# Patient Record
Sex: MALE | Race: WHITE | NOT HISPANIC OR LATINO | Employment: FULL TIME
[De-identification: names, ages, dates, MRNs, and addresses within clinical notes are randomized per-mention and may not be internally consistent; named-entity substitution may affect disease eponyms.]

---
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
## 2021-02-05 ENCOUNTER — OFFICE VISIT (OUTPATIENT)
Dept: FAMILY MEDICINE | Facility: CLINIC | Age: 21
End: 2021-02-05
Payer: COMMERCIAL

## 2021-02-05 VITALS
WEIGHT: 152 LBS | SYSTOLIC BLOOD PRESSURE: 136 MMHG | BODY MASS INDEX: 23.04 KG/M2 | RESPIRATION RATE: 18 BRPM | HEIGHT: 68 IN | OXYGEN SATURATION: 98 % | DIASTOLIC BLOOD PRESSURE: 83 MMHG | HEART RATE: 83 BPM | TEMPERATURE: 98 F

## 2021-02-05 DIAGNOSIS — K21.9 GASTROESOPHAGEAL REFLUX DISEASE, UNSPECIFIED WHETHER ESOPHAGITIS PRESENT: Primary | ICD-10-CM

## 2021-02-05 PROBLEM — Z80.0 FH: COLON CANCER IN RELATIVE <50 YEARS OLD: Status: ACTIVE | Noted: 2021-02-05

## 2021-02-05 PROCEDURE — 99203 OFFICE O/P NEW LOW 30 MIN: CPT | Performed by: PHYSICIAN ASSISTANT

## 2021-02-05 ASSESSMENT — MIFFLIN-ST. JEOR: SCORE: 1668.97

## 2021-02-05 NOTE — PATIENT INSTRUCTIONS
Patient Education     GERD (Adult)    The esophagus is a tube that carries food from the mouth to the stomach. A valve (the LES, lower esophageal sphincter) at the lower end of the esophagus prevents stomach acid from flowing upward. When this valve doesn't work properly, stomach contents may repeatedly flow back up (reflux) into the esophagus. This is called gastroesophageal reflux disease (GERD). GERD can irritate the esophagus. It can cause problems with pain, swallowing or breathing. In severe cases, GERD can cause recurrent pneumonia (from aspiration or breathing in particles) or other serious problems.  Symptoms of reflux include burning, pressure or sharp pain in the upper abdomen or mid to lower chest. The pain can spread to the neck, back, or shoulder. There may be belching, an acid taste in the back of the throat, chronic cough, or sore throat, or hoarseness. GERD symptoms often occur during the day after a big meal. They can also occur at night when lying down.   Home care  Lifestyle changes can help reduce symptoms. If needed, your healthcare provider may prescribe medicines. Symptoms often improve with treatment, but if treatment is stopped, the symptoms often return after a few months. So most persons with GERD will need to continue treatment or get treatment on and off.  Lifestyle changes    Limit or avoid fatty, fried, and spicy foods, as well as coffee, chocolate, mint, and foods with high acid content such as tomatoes and citrus fruit and juices (orange, grapefruit, lemon).    Don t eat large meals, especially at night. Frequent, smaller meals are best. Don't lie down right after eating. And don t eat anything 3 hours before going to bed.    Don't drink alcohol or smoke. As much as possible, stay away from second hand smoke.    If you are overweight, losing weight will reduce symptoms.     Don't wear tight clothing around your stomach area.    If your symptoms occur during sleep, use a foam wedge  "to elevate your upper body (not just your head.) Or, place 4\" blocks under the head of your bed. Or use 2 bed risers under your bedframe.  Medicines  If needed, medicines can help relieve the symptoms of GERD and prevent damage to the esophagus. Discuss a medicine plan with your healthcare provider. This may include one or more of the following medicines:    Antacids to help neutralize the normal acids in your stomach.    Acid blockers (Histamine or H2 blockers) to decrease acid production.    Acid inhibitors (proton pump inhibitors PPIs) to decrease acid production in a different way than the blockers. They may work better, but can take a little longer to take effect.  Take an antacid 30 to 60 minutes after eating and at bedtime, but not at the same time as an acid blocker.  Try not to take medicines such as ibuprofen and aspirin. If you are taking aspirin for your heart or other medical reasons, talk to your healthcare provider about stopping it.  Follow-up care  Follow up with your healthcare provider or as advised by our staff.  When to seek medical advice  Call your healthcare provider if any of the following occur:    Stomach pain gets worse or moves to the lower right abdomen (appendix area)    Chest pain appears or gets worse, or spreads to the back, neck, shoulder, or arm    An over-the-counter trial of medicine doesn't relieve your symptoms    Weight loss that can't be explained    Trouble or pain swallowing    Frequent vomiting (can t keep down liquids)    Blood in the stool or vomit (red or black in color)    Feeling weak or dizzy    Fever of 100.4 F (38 C) or higher, or as directed by your healthcare provider  Jane last reviewed this educational content on 3/1/2018    5010-7495 The "[employer]. [address] [place] [address], [place] [address]. All rights reserved. This information is not intended as a substitute for professional medical care. Always follow your healthcare professional's " instructions.           Patient Education     Medicines for GERD  Gastroesophageal reflux disease (GERD) can be treated with medicine. This may be done with a medicine you can buy over the counter. Or with a medicine that your healthcare provider has to prescribe. In some cases, both types may be used. Your provider will tell you what is best for your symptoms.   Antacids  Antacids work to weaken the acid in your stomach. They can give you quick relief. You can buy many of them with no prescription. Antacids can be high in sodium. This may be a problem if you have high blood pressure. Some antacids also have aluminum. This should be avoided if you have long-term (chronic) kidney disease. So check with your provider first. Take antacids only when you need to, as advised by your provider.   Side effects: Constipation, diarrhea. If you take too much medicine, it can cause calcium to build up.    H-2 blockers  These cause the stomach to make less acid. They are often used on demand as symptoms occur. And they are used daily to keep symptoms away. Your provider may prescribe them if antacids don t work for you. You can buy some of them over the counter. These come in a lower dosage.   Side effects: Confusion in older adults.   Proton-pump inhibitors  These also cause the stomach to make less acid. They reduce stomach acid more than H-2 blockers. They may be used for a short time, or longer to treat certain conditions. You can buy some of them over the counter. Or your provider may prescribe them. They help control GERD symptoms.   Side effects: Belly pain, diarrhea, upset stomach. Possible other side effects linked to long-term use and high doses.   Prokinetics  These medicines affect the movement of the digestive tract. They may be advised if your stomach is emptying too slowly. But in most cases they are not advised for treating GERD.   Side effects:Tiredness, depression, anxiety, problems with physical movement, belly  cramps, constipation, diarrhea, a jittery feeling.   Medicines to stay away from  Don t take aspirin without your healthcare provider s approval. And don t take a nonsteroidal anti-inflammatory drug (NSAID), such as ibuprofen. These reduce the protective lining of your stomach. This can lead to more GERD symptoms. Check with your provider or pharmacist before taking a new medicine.   [employer] last reviewed this educational content on 6/1/2019 2000-2020 [employer]. [address] [place] [address], [place] [address]. All rights reserved. This information is not intended as a substitute for professional medical care. Always follow your healthcare professional's instructions.

## 2021-02-05 NOTE — PROGRESS NOTES
"    Hunter Matamoros is a 20 year old who presents to clinic today for the following health issues     HPI       GERD/Heartburn  Onset/Duration: 3ish years   Description: worse over the last year   Intensity: moderate  Progression of Symptoms: worsening  Accompanying Signs & Symptoms:  Does it feel like food gets stuck or trouble swallowing: no  Nausea: no  Vomiting (bloody?): no  Abdominal Pain: no  Black-Tarry stools: no  Bloody stools: no  History:  Previous similar episodes: no  Previous ulcers: no  Precipitating factors:   Caffeine use: YES-   Alcohol use: YES- occasionally   NSAID/Aspirin use: no  Tobacco use: no  Worse with after eating, coffee, spicy food, fatty foods, alcohol   Alleviating factors: None  Therapies tried and outcome:             Lifestyle changes: switched from coffee to tea recently             Medications: Tums helps     New Patient/Transfer of Care    Review of Systems   Constitutional, HEENT, cardiovascular, pulmonary, gi and gu systems are negative, except as otherwise noted.      Objective    /83   Pulse 83   Temp 98  F (36.7  C) (Tympanic)   Resp 18   Ht 1.727 m (5' 8\")   Wt 68.9 kg (152 lb)   SpO2 98%   BMI 23.11 kg/m    Body mass index is 23.11 kg/m .  Physical Exam   GENERAL: healthy, alert and no distress  EYES: Eyes grossly normal to inspection, PERRL and conjunctivae and sclerae normal  HENT: ear canals and TM's normal, nose and mouth without ulcers or lesions  NECK: no adenopathy, no asymmetry, masses, or scars and thyroid normal to palpation  RESP: lungs clear to auscultation - no rales, rhonchi or wheezes  CV: regular rate and rhythm, normal S1 S2, no S3 or S4, no murmur, click or rub, no peripheral edema and peripheral pulses strong  ABDOMEN: soft, nontender, no hepatosplenomegaly, no masses and bowel sounds normal  MS: no gross musculoskeletal defects noted, no edema  NEURO: Normal strength and tone, mentation intact and speech normal  PSYCH: mentation " appears normal, affect normal/bright      ICD-10-CM    1. Gastroesophageal reflux disease, unspecified whether esophagitis present  K21.9 omeprazole (PRILOSEC) 20 MG DR capsule     Talk to patient about his concerns.  We talked about dietary changes.  We talked about starting omeprazole daily for 4 weeks and follow-up at that time if he is not improving otherwise on as-needed basis.  Warning signs side effects were discussed.   see patient instructions for details.        Oneal Reyes PA-C

## 2021-04-11 ENCOUNTER — HEALTH MAINTENANCE LETTER (OUTPATIENT)
Age: 21
End: 2021-04-11

## 2021-04-27 ENCOUNTER — NURSE TRIAGE (OUTPATIENT)
Dept: NURSING | Facility: CLINIC | Age: 21
End: 2021-04-27

## 2021-04-27 NOTE — TELEPHONE ENCOUNTER
"Patient having.cold symptoms    Runny nose,   Mild sore throat ,phlegm is yellowish.  No fever , had chills yesterday, and had body aches     Has covid questions.  Answered testing questions.per protocol.    Val Tineo RN on 4/27/2021 at 11:17 AM  COVID 19 Nurse Triage Plan/Patient Instructions    Please be aware that novel coronavirus (COVID-19) may be circulating in the community. If you develop symptoms such as fever, cough, or SOB or if you have concerns about the presence of another infection including coronavirus (COVID-19), please contact your health care provider or visit [email or web address].     Disposition/Instructions    Home care recommended. Follow home care protocol based instructions.  Virtual Visit with provider recommended. Reference Visit Selection Guide.  Additional COVID19 information to add for patients.   How can I protect others?  If you have symptoms (fever, cough, body aches or trouble breathing): Stay home and away from others (self-isolate) until:    At least 10 days have passed since your symptoms started, And     You ve had no fever--and no medicine that reduces fever--for 1 full day (24 hours), And      Your other symptoms have resolved (gotten better).     If you don t have symptoms, but a test showed that you have COVID-19 (you tested positive):    Stay home and away from others (self-isolate). Follow the tips under \"How do I self-isolate?\" below for 10 days (20 days if you have a weak immune system).    You don't need to be retested for COVID-19 before going back to school or work. As long as you're fever-free and feeling better, you can go back to school, work and other activities after waiting the 10 or 20 days.     How do I self-isolate?    Stay in your own room, even for meals. Use your own bathroom if you can.     Stay away from others in your home. No hugging, kissing or shaking hands. No visitors.    Don t go to work, school or anywhere else.     Clean  high " touch  surfaces often (doorknobs, counters, handles, etc.). Use a household cleaning spray or wipes. You ll find a full list on the EPA website:  www.epa.gov/pesticide-registration/list-n-disinfectants-use-against-sars-cov-2.    Cover your mouth and nose with a mask, tissue or washcloth to avoid spreading germs.    Wash your hands and face often. Use soap and water.    Caregivers in these groups are at risk for severe illness due to COVID-19:  o People 65 years and older  o People who live in a nursing home or long-term care facility  o People with chronic disease (lung, heart, cancer, diabetes, kidney, liver, immunologic)  o People who have a weakened immune system, including those who:  - Are in cancer treatment  - Take medicine that weakens the immune system, such as corticosteroids  - Had a bone marrow or organ transplant  - Have an immune deficiency  - Have poorly controlled HIV or AIDS  - Are obese (body mass index of 40 or higher)  - Smoke regularly    Caregivers should wear gloves while washing dishes, handling laundry and cleaning bedrooms and bathrooms.    Use caution when washing and drying laundry: Don t shake dirty laundry, and use the warmest water setting that you can.    For more tips, go to www.cdc.gov/coronavirus/2019-ncov/downloads/10Things.pdf.    How can I take care of myself?  1. Get lots of rest. Drink extra fluids (unless a doctor has told you not to).     2. Take Tylenol (acetaminophen) for fever or pain. If you have liver or kidney problems, ask your family doctor if it s okay to take Tylenol.     Adults can take either:     650 mg (two 325 mg pills) every 4 to 6 hours, or     1,000 mg (two 500 mg pills) every 8 hours as needed.     Note: Don t take more than 3,000 mg in one day.   Acetaminophen is found in many medicines (both prescribed and over-the-counter medicines). Read all labels to be sure you don t take too much.     For children, check the Tylenol bottle for the right dose. The  dose is based on the child s age or weight.    3. If you have other health problems (like cancer, heart failure, an organ transplant or severe kidney disease): Call your specialty clinic if you don t feel better in the next 2 days.    4. Know when to call 911: Emergency warning signs include:    Trouble breathing or shortness of breath    Pain or pressure in the chest that doesn t go away    Feeling confused like you haven t felt before, or not being able to wake up    Bluish-colored lips or face    What are the symptoms of COVID-19?     The most common symptoms are cough, fever and trouble breathing.     Less common symptoms include body aches, chills, diarrhea (loose, watery poops), fatigue (feeling very tired), headache, runny nose, sore throat and loss of smell.    COVID-19 can cause severe coughing (bronchitis) and lung infection (pneumonia).    How does it spread?     The virus may spread when a person coughs or sneezes into the air. The virus can travel about 6 feet this way, and it can live on surfaces.      Common  (household disinfectants) will kill the virus.    Who is at risk?  Anyone can catch COVID-19 if they re around someone who has the virus.    How can others protect themselves?     Stay away from people who have COVID-19 (or symptoms of COVID-19).    Wash hands often with soap and water. Or, use hand  with at least 60% alcohol.    Avoid touching the eyes, nose or mouth.     Wear a face mask when you go out in public, when sick or when caring for a sick person.    Where can I get more information?     [employer] [place]: About COVID-19: [email or web address]    CDC: What to Do If You re Sick: www.cdc.gov/coronavirus/2019-ncov/about/steps-when-sick.html    CDC: Ending Home Isolation: www.cdc.gov/coronavirus/2019-ncov/hcp/disposition-in-home-patients.html     CDC: Caring for Someone: www.cdc.gov/coronavirus/2019-ncov/if-you-are-sick/care-for-someone.html     [facility]: Interim  Guidance for Hospital Discharge to Home: [email or web address]/[email or web address]/hcp/hospdischarge.pdf    [facility] clinical trials (COVID-19 research studies): [email or web address]-clinical-trials     Below are the COVID-19 hotlines at the [place] [facility] ([facility]). Interpreters are available.   o For health questions: Call 894-435-3507 or 1-847.283.8480 (7 a.m. to 7 p.m.)  o For questions about schools and childcare: Call 869-579-3676 or 1-562.750.3217 (7 a.m. to 7 p.m.)          Thank you for taking steps to prevent the spread of this virus.  o Limit your contact with others.  o Wear a simple mask to cover your cough.  o Wash your hands well and often.    Resources     [employer]: About COVID-19: [email or web address]    CDC: What to Do If You're Sick: www.cdc.gov/coronavirus/2019-ncov/about/steps-when-sick.html    CDC: Ending Home Isolation: www.cdc.gov/coronavirus/2019-ncov/hcp/disposition-in-home-patients.html     CDC: Caring for Someone: www.cdc.gov/coronavirus/2019-ncov/if-you-are-sick/care-for-someone.html     [facility]: Interim Guidance for Hospital Discharge to Home: [email or web address]/[email or web address]/hcp/hospdischarge.pdf    [facility] clinical trials (COVID-19 research studies): [email or web address]-clinical-trials     Below are the COVID-19 hotlines at the [place] [facility] ([facility]). Interpreters are available.   o For health questions: Call 374-390-3557 or 1-952.947.9801 (7 a.m. to 7 p.m.)  o For questions about schools and childcare: Call 969-268-6127 or 1-551.652.6870 (7 a.m. to 7 p.m.)                   Reason for Disposition    COVID-19 Testing, questions about    Protocols used: CORONAVIRUS (COVID-19) DIAGNOSED OR [id number] 1.3

## 2021-05-04 ENCOUNTER — IMMUNIZATION (OUTPATIENT)
Dept: PEDIATRICS | Facility: CLINIC | Age: 21
End: 2021-05-04
Payer: COMMERCIAL

## 2021-05-04 PROCEDURE — 91300 PR COVID VAC PFIZER DIL RECON 30 MCG/0.3 ML IM: CPT

## 2021-05-04 PROCEDURE — 0001A PR COVID VAC PFIZER DIL RECON 30 MCG/0.3 ML IM: CPT

## 2021-05-25 ENCOUNTER — IMMUNIZATION (OUTPATIENT)
Dept: NURSING | Facility: CLINIC | Age: 21
End: 2021-05-25
Attending: INTERNAL MEDICINE
Payer: COMMERCIAL

## 2021-05-25 PROCEDURE — 0002A PR COVID VAC PFIZER DIL RECON 30 MCG/0.3 ML IM: CPT

## 2021-05-25 PROCEDURE — 91300 PR COVID VAC PFIZER DIL RECON 30 MCG/0.3 ML IM: CPT

## 2021-09-26 ENCOUNTER — HEALTH MAINTENANCE LETTER (OUTPATIENT)
Age: 21
End: 2021-09-26

## 2021-11-04 ENCOUNTER — MYC MEDICAL ADVICE (OUTPATIENT)
Dept: FAMILY MEDICINE | Facility: CLINIC | Age: 21
End: 2021-11-04

## 2021-11-04 DIAGNOSIS — K21.9 GASTROESOPHAGEAL REFLUX DISEASE, UNSPECIFIED WHETHER ESOPHAGITIS PRESENT: Primary | ICD-10-CM

## 2021-11-29 ASSESSMENT — ENCOUNTER SYMPTOMS
NAUSEA: 0
HEARTBURN: 0
EYE PAIN: 0
ABDOMINAL PAIN: 0
DIARRHEA: 0
NERVOUS/ANXIOUS: 0
COUGH: 0
HEMATURIA: 0
FEVER: 0
CHILLS: 0
JOINT SWELLING: 0
WEAKNESS: 0
SHORTNESS OF BREATH: 0
ARTHRALGIAS: 0
MYALGIAS: 0
DYSURIA: 0
DIZZINESS: 0
HEMATOCHEZIA: 0
FREQUENCY: 0
PARESTHESIAS: 0
HEADACHES: 0
SORE THROAT: 0
PALPITATIONS: 0
CONSTIPATION: 0

## 2021-12-03 ASSESSMENT — ENCOUNTER SYMPTOMS
SORE THROAT: 0
DIARRHEA: 0
COUGH: 0
DIZZINESS: 0
HEMATOCHEZIA: 0
EYE PAIN: 0
CONSTIPATION: 0
HEARTBURN: 0
ARTHRALGIAS: 0
ABDOMINAL PAIN: 0
WEAKNESS: 0
DYSURIA: 0
FREQUENCY: 0
MYALGIAS: 0
PALPITATIONS: 0
HEADACHES: 0
HEMATURIA: 0
NERVOUS/ANXIOUS: 0
NAUSEA: 0
SHORTNESS OF BREATH: 0
CHILLS: 0
PARESTHESIAS: 0
JOINT SWELLING: 0
FEVER: 0

## 2021-12-03 NOTE — PROGRESS NOTES
SUBJECTIVE:   CC: Saturnino Matthews is an 21 year old male who presents for preventative health visit.     Patient has been advised of split billing requirements and indicates understanding: Yes         SH-interested in medication school.       Pt is not fasting and did not have labs completed.        - Per pt discuss looking into meds for acid reflux sx.   Omeprazole worked for 1.5 months. But gave him diarrhea. Even when he re-tried it gave him diarrhea.   Lansoprazole or nexium gave him diarrhea and the other didn't work.   Famotidine helped but not as much as omeprazole.   No dark or bloody stools.     Doesn't eat before bed. Doesn't eat large amounts.   bmi is normal.   Not much caffeine or alcohol. 1-2 for both a week.   Nonsmoker.     Has kept a food diary to see if he could figure anything.  Banana bread muffin even triggered it.     No nsaids.     Heartburn for many years, most days. Random.           Healthy Habits:    Getting at least 3 servings of Calcium per day:  Yes    Bi-annual eye exam:  Yes    Dental care twice a year:  Yes    Sleep apnea or symptoms of sleep apnea:  None    Diet:  Regular (no restrictions)    Frequency of exercise:  2-3 days/week    Duration of exercise:  45-60 minutes    Taking medications regularly:  Yes    Medication side effects:  Not applicable    PHQ-2 Total Score:    Additional concerns today:  Yes           Today's PHQ-2 Score:   PHQ-2 ( 1999 Pf[employer]) 11/29/2021   Q1: Little interest or pleasure in doing things 0   Q2: Feeling down, depressed or hopeless 0   PHQ-2 Score 0   PHQ-2 Total Score (12-17 Years)- Positive if 3 or more points; Administer PHQ-A if positive -   Q1: Little interest or pleasure in doing things Not at all   Q2: Feeling down, depressed or hopeless Not at all   PHQ-2 Score 0           Abuse: Current or Past(Physical, Sexual or Emotional)- No  Do you feel safe in your environment? Yes        Social History     Tobacco Use     Smoking status: Never  Smoker     Smokeless tobacco: Never Used     Tobacco comment: No exposure.   Substance Use Topics     Alcohol use: Yes     If you drink alcohol do you typically have >3 drinks per day or >7 drinks per week? No    No flowsheet data found.    Last PSA: No results found for: PSA    Reviewed orders with patient. Reviewed health maintenance and updated orders accordingly - Yes  Lab work is in process  Labs reviewed in EPIC  BP Readings from Last 3 Encounters:   12/06/21 134/81   02/05/21 136/83   09/29/16 133/82 (93 %, Z = 1.48 /  93 %, Z = 1.48)*     *BP percentiles are based on the 2017 AAP Clinical Practice Guideline for boys    Wt Readings from Last 3 Encounters:   12/06/21 67.6 kg (149 lb)   02/05/21 68.9 kg (152 lb)   09/29/16 59.9 kg (132 lb 0.9 oz) (27 %, Z= -0.61)*     * Growth percentiles are based on [facility] (Boys, 2-20 Years) data.                  Patient Active Problem List   Diagnosis     Palpitations     Gastroesophageal reflux disease, unspecified whether esophagitis present     FH: colon cancer in relative <50 years old     Past Surgical History:   Procedure Laterality Date     ENT SURGERY  Adenoids       Social History     Tobacco Use     Smoking status: Never Smoker     Smokeless tobacco: Never Used     Tobacco comment: No exposure.   Substance Use Topics     Alcohol use: Yes     Family History   Problem Relation Age of Onset     Hypertension Mother      Colon Cancer Father 48        Stage 4 colon cancer.     Hypertension Maternal Grandmother      Aneurysm Maternal Grandfather         Brain     Melanoma Paternal Grandfather      Prostate Cancer Paternal Grandfather      Other - See Comments Other         A-fib.         Current Outpatient Medications   Medication Sig Dispense Refill     famotidine (PEPCID) 20 MG tablet Take 1 tablet (20 mg) by mouth 2 times daily 60 tablet 1     Allergies   Allergen Reactions     Penicillins      Zithromax [Azithromycin]        Reviewed and updated as needed this visit by  clinical staff  Tobacco  Allergies  Meds   Med Hx  Surg Hx  Fam Hx  Soc Hx       Reviewed and updated as needed this visit by Provider               History reviewed. No pertinent past medical history.   Past Surgical History:   Procedure Laterality Date     ENT SURGERY  Adenoids       Review of Systems   Constitutional: Negative for chills and fever.   HENT: Negative for congestion, ear pain, hearing loss and sore throat.    Eyes: Negative for pain and visual disturbance.   Respiratory: Negative for cough and shortness of breath.    Cardiovascular: Negative for chest pain, palpitations and peripheral edema.   Gastrointestinal: Negative for abdominal pain, constipation, diarrhea, heartburn, hematochezia and nausea.   Genitourinary: Negative for dysuria, frequency, genital sores, hematuria, impotence, penile discharge and urgency.   Musculoskeletal: Negative for arthralgias, joint swelling and myalgias.   Skin: Negative for rash.   Neurological: Negative for dizziness, weakness, headaches and paresthesias.   Psychiatric/Behavioral: Negative for mood changes. The patient is not nervous/anxious.      OBJECTIVE:   /81   Pulse 96   Temp 97.2  F (36.2  C) (Tympanic)   Resp 14   Wt 67.6 kg (149 lb)   SpO2 99%   BMI 22.66 kg/m      Physical Exam  GENERAL: healthy, alert and no distress  EYES: Eyes grossly normal to inspection, PERRL and conjunctivae and sclerae normal  HENT: ear canals and TM's normal, nose and mouth without ulcers or lesions  NECK: no adenopathy, no asymmetry, masses, or scars and thyroid normal to palpation  RESP: lungs clear to auscultation - no rales, rhonchi or wheezes  CV: regular rate and rhythm, normal S1 S2, no S3 or S4, no murmur, click or rub, no peripheral edema and peripheral pulses strong  ABDOMEN: soft, nontender, no hepatosplenomegaly, no masses and bowel sounds normal  MS: no gross musculoskeletal defects noted, no edema  SKIN: no suspicious lesions or rashes  NEURO:  "Normal strength and tone, mentation intact and speech normal  PSYCH: mentation appears normal, affect normal/bright    Diagnostic Test Results:  Labs reviewed in Epic    ASSESSMENT/PLAN:   (Z00.00) Routine general medical examination at a health care facility  (primary encounter diagnosis)  Comment:   Plan:     (K21.9) Gastroesophageal reflux disease, unspecified whether esophagitis present  Comment: not improving  Plan: Helicobacter pylori Antigen Stool, famotidine         (PEPCID) 20 MG tablet, Adult Gastro Ref -         Consult Only          See below for plan    May need EDG see below    Patient has been advised of split billing requirements and indicates understanding: Yes  COUNSELING:   Reviewed preventive health counseling, as reflected in patient instructions       Regular exercise       Healthy diet/nutrition       Vision screening       Hearing screening    Estimated body mass index is 22.66 kg/m  as calculated from the following:    Height as of 2/5/21: 1.727 m (5' 8\").    Weight as of this encounter: 67.6 kg (149 lb).         He reports that he has never smoked. He has never used smokeless tobacco.    Patient Instructions   Schedule with gastroenterology, you can cancel if symptoms resolve but this way you have an appointment [facility]    Start famotidine twice daily    Take for 6 weeks. If not helpful let me know after 1 week and I will order scope. OR let me know if after stopping medication in 6 weeks symptoms return and I will order scope.           Preventive Health Recommendations  Male Ages 21 - 25     Yearly exam:             See your health care provider every year in order to  o   Review health changes.   o   Discuss preventive care.    o   Review your medicines if your doctor has prescribed any.    You should be tested each year for STDs (sexually transmitted diseases).     Talk to your provider about cholesterol testing.      If you are at risk for diabetes, you should have a diabetes test (fasting " glucose).    Shots: Get a flu shot each year. Get a tetanus shot every 10 years.     Nutrition:    Eat at least 5 servings of fruits and vegetables daily.     Eat whole-grain bread, whole-wheat pasta and brown rice instead of white grains and rice.     Get adequate calcium and Vitamin D.     Lifestyle    Exercise for at least 150 minutes a week (30 minutes a day, 5 days a week). This will help you control your weight and prevent disease.     Limit alcohol to one drink per day.     No smoking.     Wear sunscreen to prevent skin cancer.     See your dentist every six months for an exam and cleaning.     Patient Education     Tips to Control Acid Reflux    To control acid reflux, you ll need to make some basic diet and lifestyle changes. The simple steps outlined below may be all you ll need to ease discomfort.   Watch what you eat    Don't have fatty foods or spicy foods.    Eat fewer acidic foods, such as citrus and tomato-based foods. These can increase symptoms.    Limit drinking alcohol, caffeine, and fizzy beverages. All increase acid reflux.    Try limiting chocolate, peppermint, and spearmint. These can make acid reflux worse in some people.    Watch when you eat    Don't lie down for 3 hours after eating.    Don't snack before going to bed.    Raise your head  Raising your head and upper body by 4 to 6 inches helps limit reflux when you re lying down. Put blocks under the head of your bed frame or a wedge under your mattress to raise it.   Other changes    Lose weight, if you need to    Don t exercise near bedtime    Don't wear tight-fitting clothes    Limit aspirin and ibuprofen    Stop smoking    Five minutes last reviewed this educational content on 6/1/2019 2000-2021 [employer]. All rights reserved. This information is not intended as a substitute for professional medical care. Always follow your healthcare professional's instructions.               Counseling Resources:  ATP IV  Guidelines  Pooled Cohorts Equation Calculator  FRAX Risk Assessment  ICSI Preventive Guidelines  Dietary Guidelines for Americans, 2010  USDA's MyPlate  ASA Prophylaxis  Lung CA Screening    MON[facility]

## 2021-12-03 NOTE — PATIENT INSTRUCTIONS
Schedule with gastroenterology, you can cancel if symptoms resolve but this way you have an appointment [facility]    Start famotidine twice daily    Take for 6 weeks. If not helpful let me know after 1 week and I will order scope. OR let me know if after stopping medication in 6 weeks symptoms return and I will order scope.           Preventive Health Recommendations  Male Ages 21 - 25     Yearly exam:             See your health care provider every year in order to  o   Review health changes.   o   Discuss preventive care.    o   Review your medicines if your doctor has prescribed any.    You should be tested each year for STDs (sexually transmitted diseases).     Talk to your provider about cholesterol testing.      If you are at risk for diabetes, you should have a diabetes test (fasting glucose).    Shots: Get a flu shot each year. Get a tetanus shot every 10 years.     Nutrition:    Eat at least 5 servings of fruits and vegetables daily.     Eat whole-grain bread, whole-wheat pasta and brown rice instead of white grains and rice.     Get adequate calcium and Vitamin D.     Lifestyle    Exercise for at least 150 minutes a week (30 minutes a day, 5 days a week). This will help you control your weight and prevent disease.     Limit alcohol to one drink per day.     No smoking.     Wear sunscreen to prevent skin cancer.     See your dentist every six months for an exam and cleaning.     Patient Education     Tips to Control Acid Reflux    To control acid reflux, you ll need to make some basic diet and lifestyle changes. The simple steps outlined below may be all you ll need to ease discomfort.   Watch what you eat    Don't have fatty foods or spicy foods.    Eat fewer acidic foods, such as citrus and tomato-based foods. These can increase symptoms.    Limit drinking alcohol, caffeine, and fizzy beverages. All increase acid reflux.    Try limiting chocolate, peppermint, and spearmint. These can make acid reflux worse in  some people.    Watch when you eat    Don't lie down for 3 hours after eating.    Don't snack before going to bed.    Raise your head  Raising your head and upper body by 4 to 6 inches helps limit reflux when you re lying down. Put blocks under the head of your bed frame or a wedge under your mattress to raise it.   Other changes    Lose weight, if you need to    Don t exercise near bedtime    Don't wear tight-fitting clothes    Limit aspirin and ibuprofen    Stop smoking    [employer] last reviewed this educational content on 6/1/2019 2000-2021 [employer]. All rights reserved. This information is not intended as a substitute for professional medical care. Always follow your healthcare professional's instructions.

## 2021-12-06 ENCOUNTER — OFFICE VISIT (OUTPATIENT)
Dept: FAMILY MEDICINE | Facility: CLINIC | Age: 21
End: 2021-12-06
Payer: COMMERCIAL

## 2021-12-06 ENCOUNTER — TELEPHONE (OUTPATIENT)
Dept: FAMILY MEDICINE | Facility: CLINIC | Age: 21
End: 2021-12-06
Payer: COMMERCIAL

## 2021-12-06 VITALS
HEART RATE: 96 BPM | TEMPERATURE: 97.2 F | RESPIRATION RATE: 14 BRPM | OXYGEN SATURATION: 99 % | DIASTOLIC BLOOD PRESSURE: 81 MMHG | SYSTOLIC BLOOD PRESSURE: 134 MMHG | BODY MASS INDEX: 22.66 KG/M2 | WEIGHT: 149 LBS

## 2021-12-06 DIAGNOSIS — K21.9 GASTROESOPHAGEAL REFLUX DISEASE, UNSPECIFIED WHETHER ESOPHAGITIS PRESENT: ICD-10-CM

## 2021-12-06 DIAGNOSIS — Z00.00 ROUTINE GENERAL MEDICAL EXAMINATION AT A HEALTH CARE FACILITY: Primary | ICD-10-CM

## 2021-12-06 PROCEDURE — 90472 IMMUNIZATION ADMIN EACH ADD: CPT | Performed by: PHYSICIAN ASSISTANT

## 2021-12-06 PROCEDURE — 99395 PREV VISIT EST AGE 18-39: CPT | Mod: 25 | Performed by: PHYSICIAN ASSISTANT

## 2021-12-06 PROCEDURE — 90715 TDAP VACCINE 7 YRS/> IM: CPT | Performed by: PHYSICIAN ASSISTANT

## 2021-12-06 PROCEDURE — 90471 IMMUNIZATION ADMIN: CPT | Performed by: PHYSICIAN ASSISTANT

## 2021-12-06 PROCEDURE — 99214 OFFICE O/P EST MOD 30 MIN: CPT | Mod: 25 | Performed by: PHYSICIAN ASSISTANT

## 2021-12-06 PROCEDURE — 90651 9VHPV VACCINE 2/3 DOSE IM: CPT | Performed by: PHYSICIAN ASSISTANT

## 2021-12-06 PROCEDURE — 87338 HPYLORI STOOL AG IA: CPT | Performed by: PHYSICIAN ASSISTANT

## 2021-12-06 RX ORDER — FAMOTIDINE 20 MG/1
20 TABLET, FILM COATED ORAL 2 TIMES DAILY
Qty: 60 TABLET | Refills: 1 | Status: SHIPPED | OUTPATIENT
Start: 2021-12-06 | End: 2022-05-12

## 2021-12-06 NOTE — TELEPHONE ENCOUNTER
Patient is last patient of my day and I have a block before him (was supposed to be after), could you call and see if he could come 30 minutes early?  If not that is okay but if so I can see him early! Thank you, Abeba

## 2021-12-08 LAB — H PYLORI AG STL QL IA: NEGATIVE

## 2021-12-08 NOTE — RESULT ENCOUNTER NOTE
George Matamoros,       Your recent test results are attached, if you have any questions or concerns please feel free to contact me via e-mail or call [phone].  Negative H pylori.        It was a pleasure to see you at your recent office visit.      Sincerely,  Abeba Moreno PA-C

## 2022-05-12 ENCOUNTER — OFFICE VISIT (OUTPATIENT)
Dept: FAMILY MEDICINE | Facility: CLINIC | Age: 22
End: 2022-05-12
Payer: COMMERCIAL

## 2022-05-12 ENCOUNTER — TELEPHONE (OUTPATIENT)
Dept: FAMILY MEDICINE | Facility: CLINIC | Age: 22
End: 2022-05-12

## 2022-05-12 VITALS
WEIGHT: 149 LBS | HEIGHT: 68 IN | DIASTOLIC BLOOD PRESSURE: 89 MMHG | RESPIRATION RATE: 16 BRPM | TEMPERATURE: 99 F | BODY MASS INDEX: 22.58 KG/M2 | HEART RATE: 98 BPM | SYSTOLIC BLOOD PRESSURE: 138 MMHG | OXYGEN SATURATION: 97 %

## 2022-05-12 DIAGNOSIS — K21.9 GASTROESOPHAGEAL REFLUX DISEASE, UNSPECIFIED WHETHER ESOPHAGITIS PRESENT: Primary | ICD-10-CM

## 2022-05-12 DIAGNOSIS — Z11.4 SCREENING FOR HIV (HUMAN IMMUNODEFICIENCY VIRUS): ICD-10-CM

## 2022-05-12 DIAGNOSIS — Z11.59 NEED FOR HEPATITIS C SCREENING TEST: ICD-10-CM

## 2022-05-12 PROCEDURE — 90471 IMMUNIZATION ADMIN: CPT | Performed by: FAMILY MEDICINE

## 2022-05-12 PROCEDURE — 99213 OFFICE O/P EST LOW 20 MIN: CPT | Mod: 25 | Performed by: FAMILY MEDICINE

## 2022-05-12 PROCEDURE — 90651 9VHPV VACCINE 2/3 DOSE IM: CPT | Performed by: FAMILY MEDICINE

## 2022-05-12 RX ORDER — NICOTINE POLACRILEX 4 MG/1
40 GUM, CHEWING ORAL
Status: CANCELLED | OUTPATIENT
Start: 2022-05-12

## 2022-05-12 RX ORDER — NICOTINE POLACRILEX 4 MG/1
20 GUM, CHEWING ORAL EVERY MORNING
COMMUNITY
Start: 2022-03-18 | End: 2024-01-22

## 2022-05-12 RX ORDER — OMEPRAZOLE 40 MG/1
40 CAPSULE, DELAYED RELEASE ORAL DAILY
Qty: 90 CAPSULE | Refills: 1 | Status: SHIPPED | OUTPATIENT
Start: 2022-05-12 | End: 2023-06-30

## 2022-05-12 ASSESSMENT — PAIN SCALES - GENERAL: PAINLEVEL: NO PAIN (0)

## 2022-05-12 NOTE — TELEPHONE ENCOUNTER
Pt called because he has an appointment scheduled for today with the provider and a few days ago he developed a sore throat and a runny nose.  No fever or cough.  Has not been tested for COVID.  Pt is wondering is he can still be seen today and possible get a covid test.    Please update pt with advisement.      Argelia Tomas RN  [facility]

## 2022-05-12 NOTE — PROGRESS NOTES
Hunter Coats is a 22 year old who presents for the following health issues heartburn, GERD    History of Present Illness       Reason for visit:  Chest Pain and Phlem issues.  Symptom onset:  More than a month  Symptoms include:  Intermittent Mild-moderate chest pain and phlem issues in throat.  Symptom intensity:  Mild  Symptom progression:  Staying the same  Had these symptoms before:  Yes  Has tried/received treatment for these symptoms:  Yes  Previous treatment was successful:  Yes  Prior treatment description:  I used acid reflux medication and it helped the burning sensation.  What makes it worse:  The medication helps the burning sensation, but the phlem is still present. Guaifenesin brings on chest pain and tightness. Coffee recently in the last week brings heart pain.  What makes it better:  Time will usually help the chest pain and expectorants will help with phlem.    He eats 0-1 servings of fruits and vegetables daily.He consumes 0 sweetened beverage(s) daily.He exercises with enough effort to increase his heart rate 30 to 60 minutes per day.  He exercises with enough effort to increase his heart rate 3 or less days per week.   He is taking medications regularly.       Heartburn, GERD      Review of Systems         Objective    There were no vitals taken for this visit.  There is no height or weight on file to calculate BMI.  Physical Exam         --------------------------------------------------------------------------------------------------------------------------------------       SUBJECTIVE:  HPI: Saturnino Matthews is a 22 year old male who presents for follow up on symptoms of:     Chest tighness  He also gets gooey stuff in his throat that he coughs up.   He had burning in his chest before he started taking the omeprazole 20 mg  He denies side effects from the omeprazole      He reports that he still gets symptom(s) of tightness in his chest 1-2 times a week    He continue(s) to get  "the phelghm in his throat    He has been working out and feels good while he is exercising    He was able to hike in 2 miles last weekend without chest pain.    He drinks 1-2 glass beers a week   He is a non smoker    He drink caffiene coffee 2-3 times a week       Allergies as of 05/12/2022 - Reviewed 05/12/2022   Allergen Reaction Noted     Penicillins  08/05/2016     Zithromax [azithromycin]  08/05/2016       Current Outpatient Medications:      omeprazole 20 MG tablet, , Disp: , Rfl:   Allergies as of    Diagnosis     Palpitations     Gastroesophageal reflux disease, unspecified whether esophagitis present     FH: colon cancer in relative <50 years old         PHYSICAL EXAM:  /89   Pulse 98   Temp 99  F (37.2  C) (Oral)   Resp 16   Ht 1.727 m (5' 8\")   Wt 67.6 kg (149 lb)   SpO2 97%   BMI 22.66 kg/m     General: healthy, alert and no distress  HENT: Normocephalic. TM's grossly normal, oropharynx without significant findings.  Neck: supple, without thyromegaly or thyroid nodularity and without cervical or jugular adenopathy  Lungs: Clear to auscultation  Heart:NEGATIVE, PMI normal. No lifts, heaves, or thrills. RRR. No murmurs, clicks gallops or rub  Abdomen: bowel sounds normal and soft, non-tender    No visits with results within 2 Month(s) from this visit.   Latest known visit with results is:   Office Visit on 12/06/2021   Component Date Value Ref Range Status     Helicobacter pylori Antigen Stool 12/06/2021 Negative  Negative Final    Negative for Helicobacter pylori antigen by enzyme immunoassay. A negative result indicates the absence of H. pylori antigen or that the level of antigen is below the level of detection.           ASSESSMENT / IMPRESSION:  1)Gastroesophageal Reflux Disease with evidence of LPR    PLAN:  Increase the dose of the current medication to 40 mg.  Continue current life style changes including: eat smaller, more frequent meals, last meal at least 3 hours before bedtime, " avoid chocolate, citrus, alcohol, caffeine, and peppermint and elevate head of bed  I did recommend that the patient make sure to get the recommended daily dose of calcium either through dietary sources or through supplement to compensate for the decreased calcium absorption seen in patients on long term PPI's.   The patient should follow up as needed or Follow up in 3 months.

## 2022-08-25 ENCOUNTER — MYC MEDICAL ADVICE (OUTPATIENT)
Dept: FAMILY MEDICINE | Facility: CLINIC | Age: 22
End: 2022-08-25

## 2022-08-25 DIAGNOSIS — H92.09 OTALGIA, UNSPECIFIED LATERALITY: Primary | ICD-10-CM

## 2023-04-23 ENCOUNTER — HEALTH MAINTENANCE LETTER (OUTPATIENT)
Age: 23
End: 2023-04-23

## 2023-06-23 ASSESSMENT — ENCOUNTER SYMPTOMS
HEADACHES: 0
EYE PAIN: 0
SORE THROAT: 0
WEAKNESS: 0
COUGH: 0
NAUSEA: 0
CHILLS: 0
DIARRHEA: 0
ARTHRALGIAS: 0
ABDOMINAL PAIN: 0
HEMATOCHEZIA: 0
NERVOUS/ANXIOUS: 0
HEARTBURN: 0
CONSTIPATION: 0
HEMATURIA: 0
MYALGIAS: 0
JOINT SWELLING: 0
FEVER: 0
SHORTNESS OF BREATH: 0
DIZZINESS: 0
DYSURIA: 0
FREQUENCY: 0

## 2023-06-30 ENCOUNTER — ANCILLARY PROCEDURE (OUTPATIENT)
Dept: GENERAL RADIOLOGY | Facility: CLINIC | Age: 23
End: 2023-06-30
Attending: PHYSICIAN ASSISTANT
Payer: COMMERCIAL

## 2023-06-30 ENCOUNTER — OFFICE VISIT (OUTPATIENT)
Dept: FAMILY MEDICINE | Facility: CLINIC | Age: 23
End: 2023-06-30
Payer: COMMERCIAL

## 2023-06-30 VITALS
DIASTOLIC BLOOD PRESSURE: 86 MMHG | SYSTOLIC BLOOD PRESSURE: 131 MMHG | TEMPERATURE: 97.9 F | HEIGHT: 70 IN | HEART RATE: 90 BPM | RESPIRATION RATE: 16 BRPM | BODY MASS INDEX: 23.05 KG/M2 | OXYGEN SATURATION: 97 % | WEIGHT: 161 LBS

## 2023-06-30 DIAGNOSIS — R79.89 ELEVATED LFTS: ICD-10-CM

## 2023-06-30 DIAGNOSIS — K21.9 GASTROESOPHAGEAL REFLUX DISEASE, UNSPECIFIED WHETHER ESOPHAGITIS PRESENT: ICD-10-CM

## 2023-06-30 DIAGNOSIS — R51.9 NONINTRACTABLE HEADACHE, UNSPECIFIED CHRONICITY PATTERN, UNSPECIFIED HEADACHE TYPE: ICD-10-CM

## 2023-06-30 DIAGNOSIS — R07.89 CHEST TIGHTNESS: ICD-10-CM

## 2023-06-30 DIAGNOSIS — H57.02 ANISOCORIA: ICD-10-CM

## 2023-06-30 DIAGNOSIS — R06.2 WHEEZING: ICD-10-CM

## 2023-06-30 DIAGNOSIS — Z11.4 SCREENING FOR HIV (HUMAN IMMUNODEFICIENCY VIRUS): ICD-10-CM

## 2023-06-30 DIAGNOSIS — Z11.59 NEED FOR HEPATITIS C SCREENING TEST: ICD-10-CM

## 2023-06-30 DIAGNOSIS — Z00.00 ROUTINE GENERAL MEDICAL EXAMINATION AT A HEALTH CARE FACILITY: Primary | ICD-10-CM

## 2023-06-30 LAB
ALBUMIN SERPL BCG-MCNC: 4.7 G/DL (ref 3.5–5.2)
ALP SERPL-CCNC: 53 U/L (ref 40–129)
ALT SERPL W P-5'-P-CCNC: 48 U/L (ref 0–70)
ANION GAP SERPL CALCULATED.3IONS-SCNC: 13 MMOL/L (ref 7–15)
AST SERPL W P-5'-P-CCNC: 64 U/L (ref 0–45)
BASOPHILS # BLD AUTO: 0 10E3/UL (ref 0–0.2)
BASOPHILS NFR BLD AUTO: 1 %
BILIRUB SERPL-MCNC: 0.5 MG/DL
BUN SERPL-MCNC: 17.6 MG/DL (ref 6–20)
CALCIUM SERPL-MCNC: 9.9 MG/DL (ref 8.6–10)
CHLORIDE SERPL-SCNC: 98 MMOL/L (ref 98–107)
CREAT SERPL-MCNC: 0.82 MG/DL (ref 0.67–1.17)
DEPRECATED HCO3 PLAS-SCNC: 26 MMOL/L (ref 22–29)
EOSINOPHIL # BLD AUTO: 0.1 10E3/UL (ref 0–0.7)
EOSINOPHIL NFR BLD AUTO: 2 %
ERYTHROCYTE [DISTWIDTH] IN BLOOD BY AUTOMATED COUNT: 12.3 % (ref 10–15)
GFR SERPL CREATININE-BSD FRML MDRD: >90 ML/MIN/1.73M2
GLUCOSE SERPL-MCNC: 91 MG/DL (ref 70–99)
HCT VFR BLD AUTO: 47 % (ref 40–53)
HGB BLD-MCNC: 16.3 G/DL (ref 13.3–17.7)
IMM GRANULOCYTES # BLD: 0 10E3/UL
IMM GRANULOCYTES NFR BLD: 0 %
LYMPHOCYTES # BLD AUTO: 1.7 10E3/UL (ref 0.8–5.3)
LYMPHOCYTES NFR BLD AUTO: 29 %
MCH RBC QN AUTO: 30 PG (ref 26.5–33)
MCHC RBC AUTO-ENTMCNC: 34.7 G/DL (ref 31.5–36.5)
MCV RBC AUTO: 86 FL (ref 78–100)
MONOCYTES # BLD AUTO: 0.4 10E3/UL (ref 0–1.3)
MONOCYTES NFR BLD AUTO: 7 %
NEUTROPHILS # BLD AUTO: 3.6 10E3/UL (ref 1.6–8.3)
NEUTROPHILS NFR BLD AUTO: 61 %
PLATELET # BLD AUTO: 228 10E3/UL (ref 150–450)
POTASSIUM SERPL-SCNC: 4.1 MMOL/L (ref 3.4–5.3)
PROT SERPL-MCNC: 7.8 G/DL (ref 6.4–8.3)
RBC # BLD AUTO: 5.44 10E6/UL (ref 4.4–5.9)
SODIUM SERPL-SCNC: 137 MMOL/L (ref 136–145)
WBC # BLD AUTO: 5.8 10E3/UL (ref 4–11)

## 2023-06-30 PROCEDURE — 93000 ELECTROCARDIOGRAM COMPLETE: CPT | Performed by: PHYSICIAN ASSISTANT

## 2023-06-30 PROCEDURE — 71046 X-RAY EXAM CHEST 2 VIEWS: CPT | Mod: TC | Performed by: RADIOLOGY

## 2023-06-30 PROCEDURE — 85025 COMPLETE CBC W/AUTO DIFF WBC: CPT | Performed by: PHYSICIAN ASSISTANT

## 2023-06-30 PROCEDURE — 87389 HIV-1 AG W/HIV-1&-2 AB AG IA: CPT | Performed by: PHYSICIAN ASSISTANT

## 2023-06-30 PROCEDURE — 80053 COMPREHEN METABOLIC PANEL: CPT | Performed by: PHYSICIAN ASSISTANT

## 2023-06-30 PROCEDURE — 36415 COLL VENOUS BLD VENIPUNCTURE: CPT | Performed by: PHYSICIAN ASSISTANT

## 2023-06-30 PROCEDURE — 99214 OFFICE O/P EST MOD 30 MIN: CPT | Mod: 25 | Performed by: PHYSICIAN ASSISTANT

## 2023-06-30 PROCEDURE — 86803 HEPATITIS C AB TEST: CPT | Performed by: PHYSICIAN ASSISTANT

## 2023-06-30 PROCEDURE — 99395 PREV VISIT EST AGE 18-39: CPT | Performed by: PHYSICIAN ASSISTANT

## 2023-06-30 RX ORDER — CETIRIZINE HYDROCHLORIDE 10 MG/1
1 TABLET ORAL DAILY PRN
COMMUNITY
Start: 2023-05-01

## 2023-06-30 ASSESSMENT — ENCOUNTER SYMPTOMS
HEMATOCHEZIA: 0
ABDOMINAL PAIN: 0
DIARRHEA: 0
HEMATURIA: 0
COUGH: 0
ARTHRALGIAS: 0
HEADACHES: 0
SORE THROAT: 0
DYSURIA: 0
HEARTBURN: 0
FREQUENCY: 0
CHILLS: 0
CONSTIPATION: 0
SHORTNESS OF BREATH: 0
JOINT SWELLING: 0
NAUSEA: 0
EYE PAIN: 0
FEVER: 0
NERVOUS/ANXIOUS: 0
DIZZINESS: 0
WEAKNESS: 0
MYALGIAS: 0

## 2023-06-30 ASSESSMENT — PAIN SCALES - GENERAL: PAINLEVEL: NO PAIN (0)

## 2023-06-30 NOTE — PROGRESS NOTES
SUBJECTIVE:   CC: Jorge L is an 23 year old who presents for preventative health visit.        No data to display              Healthy Habits:     Getting at least 3 servings of Calcium per day:  Yes    Bi-annual eye exam:  Yes    Dental care twice a year:  Yes    Sleep apnea or symptoms of sleep apnea:  None    Diet:  Regular (no restrictions)    Frequency of exercise:  2-3 days/week    Duration of exercise:  30-45 minutes    Taking medications regularly:  Yes    Medication side effects:  Not applicable    Additional concerns today:  Yes    History of GERD - On omeprazole, 20 mg no issues. Symptoms may be worsening. 6    Family history of late age diagnosis of asthma.  Patient has occasional issues as though he cannot take a deep breath that he describes as chest tightness with possible wheezing at times.  Patient is not a smoker.  Has no exacerbation with activity or workouts.  He tried the inhaler previously to see if there is any improvement and it may help slightly, however, the patient felt more jittery with this.     Minimal caffeine use. Occasional coffee. Infrequent alcohol use with one per month. Does eat 1 out of 3 meals with spicy foods. 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  Discussed possibility of Una syndrome.  No MRI has been ordered at this time.  Intermittent issues of right-sided headache over the last 1 to 2 years.  Dull achy sensation that last for period of 15 to 20 minutes.  Uses ibuprofen at times.    Subjective issues of hand eye coordination. Has not dropped things. Issues with fine motor at times.     Today's PHQ-2 Score:       6/30/2023     7:49 AM   PHQ-2 ( 1999 [employer])   Q1: Little interest or pleasure in doing things 0   Q2: Feeling down, depressed or hopeless 0   PHQ-2 Score 0   Q1: Little interest or pleasure in doing things Not at all   Q2: Feeling down, depressed or hopeless Not at all   PHQ-2 Score 0     Have you ever done Advance Care Planning?  (For example, a Health Directive, POLST, or a discussion with a medical provider or your loved ones about your wishes): No, advance care planning information given to patient to review.  Advanced care planning was discussed at today's visit.    Social History     Tobacco Use     Smoking status: Never     Smokeless tobacco: Never     Tobacco comments:     No exposure.   Substance Use Topics     Alcohol use: Yes     Comment: Very occasional.           6/23/2023     4:56 PM   Alcohol Use   Prescreen: >3 drinks/day or >7 drinks/week? No       Last PSA: No results found for: PSA    Reviewed orders with patient. Reviewed health maintenance and updated orders accordingly - No      Reviewed and updated as needed this visit by clinical staff   Tobacco  Allergies  Meds              Reviewed and updated as needed this visit by Provider                 History reviewed. No pertinent past medical history.   Past Surgical History:   Procedure Laterality Date     ENT SURGERY  Adenoids     ORTHOPEDIC SURGERY  12/2017    [place] Tooth Extraction Bilateral Lower and Upper.       Review of Systems   Constitutional: Negative for chills and fever.   HENT: Negative for congestion, ear pain, hearing loss and sore throat.    Eyes: Negative for pain and visual disturbance.   Respiratory: Negative for cough and shortness of breath.    Cardiovascular: Negative for chest pain and peripheral edema.   Gastrointestinal: Negative for abdominal pain, constipation, diarrhea, heartburn, hematochezia and nausea.   Genitourinary: Negative for dysuria, frequency, genital sores, hematuria, impotence, penile discharge and urgency.   Musculoskeletal: Negative for arthralgias, joint swelling and myalgias.   Skin: Negative for rash.   Neurological: Negative for dizziness, weakness and headaches.   Psychiatric/Behavioral: Negative for mood changes. The patient is not nervous/anxious.        OBJECTIVE:   /86   Pulse 90   Temp 97.9  F (36.6  C)  "(Tympanic)   Resp 16   Ht 1.765 m (5' 9.5\")   Wt 73 kg (161 lb)   SpO2 97%   BMI 23.43 kg/m      Physical Exam  GENERAL: healthy, alert and no distress  EYES: Eyes grossly normal to inspection, PERRL and conjunctivae and sclerae normal  HENT: ear canals and TM's normal, nose and mouth without ulcers or lesions  NECK: no adenopathy, no asymmetry, masses, or scars and thyroid normal to palpation  RESP: lungs clear to auscultation - no rales, rhonchi or wheezes  CV: regular rate and rhythm, normal S1 S2, no S3 or S4, no murmur, click or rub, no peripheral edema and peripheral pulses strong  ABDOMEN: soft, nontender, no hepatosplenomegaly, no masses and bowel sounds normal  MS: no gross musculoskeletal defects noted, no edema  SKIN: no suspicious lesions or rashes  NEURO: Normal strength and tone, sensory exam grossly normal, mentation intact, cranial nerves 2-12 intact and DTR's normal and symmetric   PSYCH: mentation appears normal, affect normal/bright    ASSESSMENT/PLAN:   (Z00.00) Routine general medical examination at a health care facility  (primary encounter diagnosis)  Comment: Here for routine screening examination.  Plan: Comprehensive metabolic panel (BMP + Alb, Alk         Phos, ALT, AST, Total. Bili, TP), CBC with         platelets and differential          (Z11.4) Screening for HIV (human immunodeficiency virus)  Comment: Discussed screening lab  Plan: HIV Antigen Antibody Combo          (Z11.59) Need for hepatitis C screening test  Comment: Discussed screening lab  Plan: Hepatitis C Screen Reflex to HCV RNA Quant and         Genotype          (H57.02) Anisocoria  Comment: Issues with the [place] and headache.  Has followed with ophthalmology.  With possible discussion of Una syndrome.  Exact etiology not entirely clear.  Plan: Adult Neurology  Referral          (R06.2) Wheezing  Comment: Intermittent wheezing.  No wheezing on examination.  Patient is concerned about potential " asthma.  Discussed with pulmonary function test and chest x-ray.  Plan: XR Chest 2 Views, General PFT Lab (Please         always keep checked), Pulmonary Function Test          (R07.89) Chest tightness  Comment: Intermittent chest tightness.  Low suspicion of acute coronary syndrome or pulmonary embolism.  This been ongoing over the last few months.  May also be related to possible reflux symptoms.  EKG without any obvious ischemic findings.  Plan: XR Chest 2 Views, EKG 12-lead complete w/read -        Clinics          (K21.9) Gastroesophageal reflux disease, unspecified whether esophagitis present  Comment: History of esophageal reflux.  Had GI referral placed previously.  Patient did not follow-up with GI.  Plan: Adult GI  Referral - Consult Only          (R51.9) Nonintractable headache, unspecified chronicity pattern, unspecified headache type  Comment: 1 to 2 years of non-intractable right-sided headaches.  Patient is concerned as he has had anisocoria that is been noted over this timeframe.  Has follow-up with ophthalmology.  No headache at time of evaluation.  Cranial nerves II through XII grossly intact.  Plan: Adult Neurology  Referral          Of note, patient had a syncopal episode during blood draw here.  States he has had syncope with blood donation in the past.  After a brief period of observation patient was feeling fine and ambulatory.  Discussed continue to monitor symptoms return with any worsening.  Appears most consistent with vasovagal episode.    Patient has been advised of split billing requirements and indicates understanding: Yes      COUNSELING:   Reviewed preventive health counseling, as reflected in patient instructions       Regular exercise       Healthy diet/nutrition       Vision screening       Alcohol Use        Consider Hep C screening for all patients one time for ages 18-79 years       HIV screeninx in teen years, 1x in adult years, and at intervals if high  risk        He reports that he has never smoked. He has never used smokeless tobacco.            MONAE Cuello [facility]

## 2023-07-01 LAB
HCV AB SERPL QL IA: NONREACTIVE
HIV 1+2 AB+HIV1 P24 AG SERPL QL IA: NONREACTIVE

## 2023-07-03 ENCOUNTER — MYC MEDICAL ADVICE (OUTPATIENT)
Dept: GASTROENTEROLOGY | Facility: CLINIC | Age: 23
End: 2023-07-03
Payer: COMMERCIAL

## 2023-07-03 NOTE — TELEPHONE ENCOUNTER
Called to remind patient of their upcoming appointment with our GI clinic, on Thursday 7/13/2023 at 2:45PM with Chely Blue. This appointment is scheduled as an in-person appt. Please arrive 15 minutes early to check in for your appointment. , if your appointment is virtual (video or telephone) you need to be in [place] for the visit. To reschedule or cancel patient to call [phone].    Shana Wadsworth MA

## 2023-07-05 NOTE — TELEPHONE ENCOUNTER
REFERRAL INFORMATION:    Referring Provider: Mike Santos PA-C    Referring Clinic: [facility]    Reason for Visit/Diagnosis: GERD     FUTURE VISIT INFORMATION:    Appointment Date: 7/13/23    Appointment Time: 3 PM     NOTES STATUS DETAILS   OFFICE NOTE from Referring Provider Internal [place] [place]:  6/30/23- OV with Mike Santos PA-C   OFFICE NOTE from Other Specialist [facility] [place] [place]:   5/12/22- OV with Dr. Anand Vegas MD  12/6/21- OV with Abeba Moreno PA-C  2/5/21- OV with Oneal Reyes PA-C for GERD   HOSPITAL DISCHARGE SUMMARY/  ED VISITS N/A    OPERATIVE REPORT N/A    MEDICATION LIST Internal         ENDOSCOPY  N/A    COLONOSCOPY N/A    ERCP N/A    EUS N/A    STOOL TESTING Internal [place]:  12/6/21- Stool test for H. pylori   PERTINENT LABS Internal    PATHOLOGY REPORTS (RELATED) N/A    IMAGING (CT, MRI, EGD, MRCP, Small Bowel Follow Through/SBT, MR/CT Enterography) N/A

## 2023-07-13 ENCOUNTER — LAB (OUTPATIENT)
Dept: LAB | Facility: CLINIC | Age: 23
End: 2023-07-13
Payer: COMMERCIAL

## 2023-07-13 ENCOUNTER — OFFICE VISIT (OUTPATIENT)
Dept: GASTROENTEROLOGY | Facility: CLINIC | Age: 23
End: 2023-07-13
Attending: PHYSICIAN ASSISTANT
Payer: COMMERCIAL

## 2023-07-13 ENCOUNTER — PRE VISIT (OUTPATIENT)
Dept: GASTROENTEROLOGY | Facility: CLINIC | Age: 23
End: 2023-07-13

## 2023-07-13 VITALS
HEART RATE: 78 BPM | WEIGHT: 159 LBS | HEIGHT: 70 IN | SYSTOLIC BLOOD PRESSURE: 128 MMHG | OXYGEN SATURATION: 98 % | BODY MASS INDEX: 22.76 KG/M2 | DIASTOLIC BLOOD PRESSURE: 82 MMHG

## 2023-07-13 DIAGNOSIS — R07.89 CHEST TIGHTNESS: ICD-10-CM

## 2023-07-13 DIAGNOSIS — R13.19 ESOPHAGEAL DYSPHAGIA: ICD-10-CM

## 2023-07-13 DIAGNOSIS — R09.A2 GLOBUS SENSATION: ICD-10-CM

## 2023-07-13 DIAGNOSIS — R12 HEARTBURN: Primary | ICD-10-CM

## 2023-07-13 DIAGNOSIS — R19.5 LOOSE STOOLS: ICD-10-CM

## 2023-07-13 DIAGNOSIS — R74.01 ELEVATED AST (SGOT): ICD-10-CM

## 2023-07-13 DIAGNOSIS — K21.9 GASTROESOPHAGEAL REFLUX DISEASE, UNSPECIFIED WHETHER ESOPHAGITIS PRESENT: ICD-10-CM

## 2023-07-13 LAB
ALBUMIN SERPL BCG-MCNC: 4.9 G/DL (ref 3.5–5.2)
ALP SERPL-CCNC: 62 U/L (ref 40–129)
ALT SERPL W P-5'-P-CCNC: 46 U/L (ref 0–70)
AST SERPL W P-5'-P-CCNC: 28 U/L (ref 0–45)
BILIRUB DIRECT SERPL-MCNC: <0.2 MG/DL (ref 0–0.3)
BILIRUB SERPL-MCNC: 0.7 MG/DL
PROT SERPL-MCNC: 8.2 G/DL (ref 6.4–8.3)

## 2023-07-13 PROCEDURE — 36415 COLL VENOUS BLD VENIPUNCTURE: CPT | Performed by: PATHOLOGY

## 2023-07-13 PROCEDURE — 80076 HEPATIC FUNCTION PANEL: CPT | Performed by: PATHOLOGY

## 2023-07-13 PROCEDURE — 99205 OFFICE O/P NEW HI 60 MIN: CPT | Performed by: PHYSICIAN ASSISTANT

## 2023-07-13 ASSESSMENT — PAIN SCALES - GENERAL: PAINLEVEL: NO PAIN (0)

## 2023-07-13 NOTE — PATIENT INSTRUCTIONS
It was a pleasure taking care of you today.  I've included a brief summary of our discussion and care plan from today's visit below.  Please review this information with your primary care provider.  _______________________________________________________________________    My recommendations are summarized as follows:    I agree with the pulmonary function testing ordered by your primary care provider     In a few months, repeat hepatic function panel. The order is in the system     Stool studies ordered as well     You will need to get your colonoscopy at aged 29 given your family history     Let's have you start some fiber in your diet  I would recommend you get it from your diet such as fruits and veggies.   Try to get 30 grams a day  Add on Citrucel or metamucil 1 tsp a day for next 2 weeks, then increase gradually. A good goal is 1 tablespoon a day     XR of your esophagus     Let's get an upper endoscopy performed for further evaluation   Either this is refractory reflux or something called EoE  You will need to be off antacids for 2 weeks before the test  OK to take rolaids or tums 48 hours before the test    In the meantime, you can try sodium alginate (reflux gourmet) order online in addition to the omeprazole 20 daily. I would say if you develop breakthrough reflux, then increase the omeprazole to 40 mg daily.     Follow up after endoscopy     -Lab draws, call to schedule at [phone]   -Endoscopic procedures, call to schedule at [phone]  -Radiology (imaging) tests, call to schedule at [phone]    Please call my nurse Chasity at [phone] with any questions or concerns.    Return to GI Clinic in 3 months to review your progress.    _______________________________________________________________________    Who do I call with any questions after my visit?  Please be in touch if there are any further questions that arise following today's visit.  There are multiple ways to contact your  gastroenterology care team.      During business hours, you may reach a Gastroenterology nurse at [phone] and choose option 3.       To schedule or reschedule an appointment, please call [phone].     You can always send a secure message through [employer].  [employer] messages are answered by your nurse or doctor typically within 24 hours.  Please allow extra time on weekends and holidays.      For urgent/emergent questions after business hours, you may reach the on-call GI Fellow by contacting the [facility] at [phone].     How will I get the results of any tests ordered?    You will receive all of your results.  If you have signed up for [employer], any tests ordered at your visit will be available to you after your physician reviews them.  Typically this takes 1-2 weeks.  If there are urgent results that require a change in your care plan, your provider or nurse will call you to discuss the next steps.      What is [employer]?  [employer] is a secure way for you to access all of your healthcare records from the [facility].  It is a web based computer program, so you can sign on to it from any location.  It also allows you to send secure messages to your care team.  I recommend signing up for [employer] access if you have not already done so and are comfortable with using a computer.      How to I schedule a follow-up visit?  If you did not schedule a follow-up visit today, please call [phone] to schedule a follow-up office visit.      Sincerely,    Chely Blue PA-C    --  Lifestyle modifications for gastroesophageal reflux disease (GERD).     1. Change your eating habits.  -- It's best to eat several small meals instead of two or three large meals.  -- After you eat, wait 2 to 3 hours before you lie down. Late-night snacks aren't a good idea.  -- Chocolate, mint, and alcohol can make GERD worse. They relax the valve between the esophagus and the stomach.  -- Spicy foods, fatty  foods, foods that have a lot of acid (like tomatoes and oranges), and coffee can make GERD symptoms worse in some people. If your symptoms are worse after you eat a certain food, you may want to stop eating that food to see if your symptoms get better.    2. Do not smoke or chew tobacco.    3. If you have GERD symptoms at night, raise the head of your bed 6 in. (15 cm) to 8 in. (20 cm) by putting the frame on blocks or placing a foam wedge under the head of your mattress. (Adding extra pillows does not work.)    4. Avoid or reduce pressure on your stomach. Don't wear tight clothing around your middle.    5. Lose weight if you need to. Losing just 5 to 10 pounds can help.

## 2023-07-13 NOTE — LETTER
7/13/2023         RE: Saturnino Matthews  [address] [facility] [address]        Dear Colleague,    Thank you for referring your patient, Saturnino Matthews, to the [facility] [place]. Please see a copy of my visit note below.      GI CLINIC VISIT    CC/REFERRING MD:  Mike Santos  REASON FOR CONSULTATION: GERD    Chart Review    2/5/21 -  for GERD. Ongoing for 3 years - started omperzole 20 mg daily   12/6/21 -  for GERD  omperazole worked for 1.5 mo, got diarrhea, stopped it and retried it.   Lansoprazole (prevacid) or nexium  (esomeprazole) gave him diarrhea  Famotidine helped but not as much as omperazole   5/12/2022 - increase omeprazole to 40 mg daily (only for 3 months, seemed to have helped. Went back on 20 mg and sx recurred)     HPI  Saturnino Matthews is a 23 year old year old male with PMHx of seasonal allergies presenting to establish care with the [facility] GI group for GERD and upper GI sx.     Upper GI Sx   Shares a long standing hx of upper GI sx spanning to past 6-7 years.   Reports sx of phlegm sensation in throat, chest tightness, and heartburn.    Been on PPI off and on for 6-7 years for tx of these sx which he feels has helped overall. Most recently in past 6 mo he restarted omperazole 40 mg daily (which he takes 30-60 min before breakfast) . He self titrated to 20 mg daily in last few days and he feels all 3 listed sx are worsening. Feels pepcid has not helped. The worst sensation to him is the daily phlegm.     1) phlegm - shares he has a daily sensation of phlegm caught in his upper throat. Has to constantly clear his throat. If he runs dry, sensation worsen so he tries to keep hydrated. But sometimes drinking water does not help it go away either. Shares he took mucinex which has resolved this sensation.   He recently developed seasonal allergies and is taking OTC zyrtec (started earlier this year) when sx flare up. Triggers: GF dog, flowers.  "  Listed as allergic to PCN (childhood - developed fever and full body rash), and zithromax (rash, fever)  Shares his brothers also developed adult onset seasonal allergies as well.     2) anterior chest wall tightness -   Mainly to sternal area but radiates bidirectionally to R/L side when present.   Intermittent sensation, feels it is worsening though. Experiences an episode 2-3x a week, can last a few hours when present.   When present, he feels like he needs to breathe a few times in order to get to \"full lung capacity.\"  Shares he watches what he eats which helps. Tries to stay away from spicy foods (he love spicy foods), and watches the volume of food he he eats.   He does not have asthma but his older brother does have asthma. He has used friend's rescue inhaler and feels that it has helped with this tightness sensation when present.   He does have PFT ordered by PCP, not done yet    3) Dysphagia - in past year, intermittent and stable   Reports intermittent sensation for dysphagia for solids (not liquids), food gets stuck in throat and he needs to swallow a few times in order for food to go down.   Spanning into childhood, reports he tends to over-masticate. Shares that if he took too large a bite of food, he would have to chew thoroughly and then swallow the bolus in steps. When I inquired what happens if he tries to swallow the entire bolus at once, tells me he is not sure and has not tried.   Tells me he tends to be the last one to leave the dinner table eating.   Never had issues with food impactions or had to have heimlich performed on him. Never ended up on ED for impaction. No hx of aspiration PNA.      4) Heart burn  - described as burning going up throat 1x / week with regurgitation of sour material into mouth 1-2x/wk.   Can be triggered by certain foods, at one time journaling his experience, but not able to report consistent triggers.   ?hot dogs (ate 3 in one setting) and had burning sensation. "   Will take PRN tums (but infrequent) - not sure if it helps all the time     5) abd pain- started 2 year ago, noticed 1-2x a month  Crampy pain to lower abdominal region and hypogastric region. Pain tends to heralds the need to have a BM, resulting in a looser than normal BM (describes BSC Type 6-7). On the days he has this pain, BM are more frequent too. Pain goes away with BM.   Never had blood or melena. Never had mucus in BM. No rectal pain with defecation.   Outside of these episodes, he does not report abd pain in general.     Bowel pattern (all his life)   Usually 1-2 BM a day, not coupled. Reports good evacuation, typically. BSC type 6 on average, soft and easy to pass. No straining. On toilet for 5 min to have a BM.     Weight is stable  Appetite is good    Denies fevers, chills, weight loss, nausea, emesis, odynophagia, dysphonia/hoarseness, chronic cough, neck pain/swelling/mass,  abdominal pain, bloating/fullness, post prandial bloating.     Ibuprofen - takes 1-2 tabs for headaches; about 1-2x every few months    APAP - none  No use of OTC herbal supplements/weight loss products.      Rare - etoh use  Never smoker  occ edibles, otherwise no drugs     FHx   Father - dx with stage 4 colon cancer (aged 40)   Brother (31) - allergies, had a CSP that was negative. No polyps taken   Brother (29) - allergies     No other family history or GI related malignancy (esophageal, gastric, pancreatic, liver or colon) or family history of IBD/celiac disease.     Social Hx  Works as certified ophthalmic, eye clinic     PROBLEM LIST  Patient Active Problem List    Diagnosis Date Noted    Gastroesophageal reflux disease, unspecified whether esophagitis present 02/05/2021     Priority: Medium    FH: colon cancer in relative <50 years old 02/05/2021     Priority: Medium    Palpitations 09/29/2016     Priority: Medium     PERTINENT PAST MEDICAL HISTORY:  No past medical history on file.    PREVIOUS SURGERIES:  Past Surgical  History:   Procedure Laterality Date    ENT SURGERY  Adenoids    ORTHOPEDIC SURGERY  12/2017    [place] Tooth Extraction Bilateral Lower and Upper.       ALLERGIES:     Allergies   Allergen Reactions    Penicillins     Zithromax [Azithromycin]        PERTINENT MEDICATIONS:    Current Outpatient Medications:     cetirizine (ZYRTEC) 10 MG tablet, , Disp: , Rfl:     omeprazole 20 MG tablet, , Disp: , Rfl:     SOCIAL HISTORY:  Social History     Socioeconomic History    Marital status: Single     Spouse name: Not on file    Number of children: Not on file    Years of education: Not on file    Highest education level: Not on file   Occupational History    Not on file   Tobacco Use    Smoking status: Never    Smokeless tobacco: Never    Tobacco comments:     No exposure.   Vaping Use    Vaping Use: Never used   Substance and Sexual Activity    Alcohol use: Yes     Comment: Very occasional.    Drug use: Never    Sexual activity: Yes     Partners: Female     Birth control/protection: Condom   Other Topics Concern    Parent/sibling w/ CABG, MI or angioplasty before 65F 55M? No   Social History Narrative    Not on file     Social Determinants of Health     Financial Resource Strain: Not on file   Food Insecurity: Not on file   Transportation Needs: Not on file   Physical Activity: Not on file   Stress: Not on file   Social Connections: Not on file   Intimate Partner Violence: Not on file   Housing Stability: Not on file     FAMILY HISTORY:  Family History   Problem Relation Age of Onset    Hypertension Mother     Colon Cancer Father     Hypertension Maternal Grandmother     Diabetes Maternal Grandmother         Type 2 - Very prevalent on both sides of family    Aneurysm Maternal Grandfather         Brain    Melanoma Paternal Grandfather     Prostate Cancer Paternal Grandfather     Other - See Comments Other         A-fib.    Diabetes Other         My Maternal Uncle has Type 1 diabetes    Asthma Brother         Mild and  developed in pre-teens.     Past/family/social history reviewed and no changes     PHYSICAL EXAMINATION:  Vitals reviewed: There were no vitals taken for this visit.  Wt:   Wt Readings from Last 2 Encounters:   06/30/23 73 kg (161 lb)   05/12/22 67.6 kg (149 lb)        Constitutional: aaox3, cooperative, pleasant, not dyspneic/diaphoretic, no acute distress  Eyes: Sclera anicteric/injected  Ears/nose/mouth/throat: Normal oropharynx without ulcers or exudate, mucus membranes moist, hearing intact  Neck: supple, active ROM w/o limitation or pain   CV: No edema  Respiratory: Unlabored breathing  Abd: Nondistended, +bs, no hepatosplenomegaly, nontender, no peritoneal signs, neg Santiago's sign  Skin: warm, perfused, no jaundice  Psych: Normal affect  MSK: Normal gait     PERTINENT STUDIES:    Office Visit on 06/30/2023   Component Date Value Ref Range Status    HIV Antigen Antibody Combo 06/30/2023 Nonreactive  Nonreactive Final    Hepatitis C Antibody 06/30/2023 Nonreactive  Nonreactive Final    Sodium 06/30/2023 137  136 - 145 mmol/L Final    Potassium 06/30/2023 4.1  3.4 - 5.3 mmol/L Final    Chloride 06/30/2023 98  98 - 107 mmol/L Final    Carbon Dioxide (CO2) 06/30/2023 26  22 - 29 mmol/L Final    Anion Gap 06/30/2023 13  7 - 15 mmol/L Final    Urea Nitrogen 06/30/2023 17.6  6.0 - 20.0 mg/dL Final    Creatinine 06/30/2023 0.82  0.67 - 1.17 mg/dL Final    Calcium 06/30/2023 9.9  8.6 - 10.0 mg/dL Final    Glucose 06/30/2023 91  70 - 99 mg/dL Final    Alkaline Phosphatase 06/30/2023 53  40 - 129 U/L Final    AST 06/30/2023 64 (H)  0 - 45 U/L Final    ALT 06/30/2023 48  0 - 70 U/L Final    Protein Total 06/30/2023 7.8  6.4 - 8.3 g/dL Final    Albumin 06/30/2023 4.7  3.5 - 5.2 g/dL Final    Bilirubin Total 06/30/2023 0.5  <=1.2 mg/dL Final    GFR Estimate 06/30/2023 >90  >60 mL/min/1.73m2 Final    WBC Count 06/30/2023 5.8  4.0 - 11.0 10e3/uL Final    RBC Count 06/30/2023 5.44  4.40 - 5.90 10e6/uL Final    Hemoglobin  06/30/2023 16.3  13.3 - 17.7 g/dL Final    Hematocrit 06/30/2023 47.0  40.0 - 53.0 % Final    MCV 06/30/2023 86  78 - 100 fL Final    MCH 06/30/2023 30.0  26.5 - 33.0 pg Final    MCHC 06/30/2023 34.7  31.5 - 36.5 g/dL Final    RDW 06/30/2023 12.3  10.0 - 15.0 % Final    Platelet Count 06/30/2023 228  150 - 450 10e3/uL Final    % Neutrophils 06/30/2023 61  % Final    % Lymphocytes 06/30/2023 29  % Final    % Monocytes 06/30/2023 7  % Final    % Eosinophils 06/30/2023 2  % Final    % Basophils 06/30/2023 1  % Final    % Immature Granulocytes 06/30/2023 0  % Final    Absolute Neutrophils 06/30/2023 3.6  1.6 - 8.3 10e3/uL Final    Absolute Lymphocytes 06/30/2023 1.7  0.8 - 5.3 10e3/uL Final    Absolute Monocytes 06/30/2023 0.4  0.0 - 1.3 10e3/uL Final    Absolute Eosinophils 06/30/2023 0.1  0.0 - 0.7 10e3/uL Final    Absolute Basophils 06/30/2023 0.0  0.0 - 0.2 10e3/uL Final    Absolute Immature Granulocytes 06/30/2023 0.0  <=0.4 10e3/uL Final        Lab Results   Component Value Date    WBC 5.8 06/30/2023    WBC 6.2 08/05/2016    HGB 16.3 06/30/2023    HGB 16.5 (H) 08/05/2016     06/30/2023     08/05/2016    ALT 48 06/30/2023    ALT 27 08/05/2016    AST 64 (H) 06/30/2023    AST 20 08/05/2016     06/30/2023     08/05/2016    BUN 17.6 06/30/2023    BUN 14 08/05/2016    CO2 26 06/30/2023    CO2 29 08/05/2016    TSH 2.58 08/05/2016        Helicobacter pylori Antigen Stool Negative Negative     Comment: Negative for Helicobacter pylori antigen by enzyme immunoassay. A negative result indicates the absence of H. pylori antigen or that the level of antigen is below the level of detection.       Liver Function Studies -   Recent Labs   Lab Test 06/30/23  0858   PROTTOTAL 7.8   ALBUMIN 4.7   BILITOTAL 0.5   ALKPHOS 53   AST 64*   ALT 48        PREVIOUS ENDOSCOPY    No results found for this or any previous visit.    ASSESSMENT/PLAN:  Saturnino Matthews is a 23 year old year old male with PMHx  significant for seasonal alleriges and ?atopic history (likely asthma vs RAD) who presents [facility] for long standing ?GERD that has been partially responsive to course of PPI (varying dose, better on 40 mg daily). His biggest concern is that of globus sensation that occurs daily but he also reports chest tightness and classic heartburn. On history, he also mentions intermittent solid food dysphagia and some altered bowel movements.   For work-up he has had a negative CBC, neg H. Pylori stool antigen(on PPI). CMP with slight elevation in AST.   He presents today for further evaluation.     #heartburn  #globus  #chest tightness   Presenting symptoms are most consistent with EoE vs refractory GERD however the differential for symptom constellation is broad and includes esophageal motility disorder, RAD vs asthma, PND with allergies, DGBI such as functional dyspepsia / globus. Less likely to bencardiac etiology.   -We discussed management options. I recommended EGD off PPI and H2RA. We discussed the indication for endoscopy, risks versus benefits, and potential complications.  He verbalized understanding and agrees to proceed.   -Order EGD with distal/proximal esophageal biopsies, gastric biopsies (as stool antigen was taken while on PPI), and duodenal biopsies. Anesthesia OK for conscious or MAC.   -While we await scheduling, asked that he continue PPI 20 mg daily + sodium alginate PRN   If he develops breakthrough, gave OK to increase omeprazole back to 40 mg daily   -Instructed to stop PPI and H2RA 2 weeks before EGD  -GERD friendly and lifestyle dietary recommendations per AVS  -agree with PFTs with bronchodilator, as ordered by PCP     Future consideration  1. If EGD negative, consider pH impedence vs BRAVO study  2. If EGD, consider HRM for dysmotility disorder +/- timed XR esophogram with tablet   3. ENT consult for consideration of laryngoscopy   4. Daily flonase trial  with daily zyrtec and close clinical follow-up     #dysphagia   #chest tightness  Intermittent, to solids in past year in setting of an otherwise healthy 23 year old with an atopic PMhx. Presenting symptoms are most consistent with esophagitis however the differential for dysphagia includes EoE vs GERD, esophageal dysmotility, gastritis, h.pylori  -ordering XR esophagram timed with tablet    -ordering EGD off antacids per above  -cont PPI per AVS    Future consideration  If  w/u negative  1. If EGD, consider HRM for dysmotility disorder   2.If EGD neg, consider ENT consult for consideration of laryngoscopy  3. 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 Given chronicity and in absence of N/V, unlikely to be gastroenteritis.   -EGD as above (with duodenal bx).  If duodenal bx abnormal and or if sx become more persistent, will order TTG and total IgA serology  -checking giardia/cryptosoporidum and O&P. No use of abx, so will hold c.diff testing  -start fiber, per AVS   -no other systemic sx to suggest inflammatory disease, but if he drops weight or if sx continue despite conservative approaches, will plan for diagnostic CSP with random colon bx and +/- imaging to help guide endoscopy and management   -low threshold for diagnostic CSP given stage 4 CRC in father at aged 40      Future consideration   1. RD consult for low FODMAP diet  2. Trial of IB reese   3. Diagnostic CSP as  per above     #elevated AST  Very mild with otherwise normal liver enzymes. No APAP. Infrequent ETOH use. Consistent with transient elevation in liver enzyme, ?med induced. ddx includes hepatitis   -repeat in a few months, if still abnormal will plan to follow with RUQ US and viral Hep B/C panels, to start     Orders Placed This Encounter   Procedures    XR Esophagram    Hepatic panel    Adult GI  Referral - Procedure Only     Colorectal cancer screening: aged 30, unless otherwise indicated     RTC 3 mo, or otherwise clinically indicated   Can RTC to see our GI team at [facility] or GI at [facility] (which is closer to him)     Thank you for this consultation.  It was a pleasure to participate in the care of this patient; please contact me with any further questions.     Follow up: As planned above. Today, I personally spent 60 minutes in direct face to face time with the patient, of which greater than 50% of the time was spent in patient education and counseling as described above. Approximately  minutes were spent on indirect care associated with the patient's consultation including but not limited to review of: patient medical records to date, clinic visits, hospital records, lab results, imaging studies, procedural documentation, and coordinating care with other providers. The findings from this review are summarized in the above note. All of the above accounted for a cumulative time of 60 minutes and was performed on the date of service.         Again, thank you for allowing me to participate in the care of your patient.      Sincerely,    Chely Blue PA-C

## 2023-07-13 NOTE — NURSING NOTE
"Chief Complaint   Patient presents with     New Patient       Vitals:    07/13/23 1455   BP: 128/82   Pulse: 78   SpO2: 98%   Weight: 72.1 kg (159 lb)   Height: 1.765 m (5' 9.5\")       Body mass index is 23.14 kg/m .    [employer]    "

## 2023-07-13 NOTE — PROGRESS NOTES
GI CLINIC VISIT    CC/REFERRING MD:  Mike Santos  REASON FOR CONSULTATION: GERD    Chart Review    2/5/21 -  for GERD. Ongoing for 3 years - started omperzole 20 mg daily   12/6/21 -  for GERD  omperazole worked for 1.5 mo, got diarrhea, stopped it and retried it.   Lansoprazole (prevacid) or nexium  (esomeprazole) gave him diarrhea  Famotidine helped but not as much as omperazole   5/12/2022 - increase omeprazole to 40 mg daily (only for 3 months, seemed to have helped. Went back on 20 mg and sx recurred)     HPI  Saturnino Matthews is a 23 year old year old male with PMHx of seasonal allergies presenting to establish care with the [facility] GI group for GERD and upper GI sx.     Upper GI Sx   Shares a long standing hx of upper GI sx spanning to past 6-7 years.   Reports sx of phlegm sensation in throat, chest tightness, and heartburn.    Been on PPI off and on for 6-7 years for tx of these sx which he feels has helped overall. Most recently in past 6 mo he restarted omperazole 40 mg daily (which he takes 30-60 min before breakfast) . He self titrated to 20 mg daily in last few days and he feels all 3 listed sx are worsening. Feels pepcid has not helped. The worst sensation to him is the daily phlegm.     1) phlegm - shares he has a daily sensation of phlegm caught in his upper throat. Has to constantly clear his throat. If he runs dry, sensation worsen so he tries to keep hydrated. But sometimes drinking water does not help it go away either. Shares he took mucinex which has resolved this sensation.   He recently developed seasonal allergies and is taking OTC zyrtec (started earlier this year) when sx flare up. Triggers: GF dog, flowers.   Listed as allergic to PCN (childhood - developed fever and full body rash), and zithromax (rash, fever)  Shares his brothers also developed adult onset seasonal allergies as well.     2) anterior chest wall tightness -   Mainly to sternal area but radiates  "bidirectionally to R/L side when present.   Intermittent sensation, feels it is worsening though. Experiences an episode 2-3x a week, can last a few hours when present.   When present, he feels like he needs to breathe a few times in order to get to \"full lung capacity.\"  Shares he watches what he eats which helps. Tries to stay away from spicy foods (he love spicy foods), and watches the volume of food he he eats.   He does not have asthma but his older brother does have asthma. He has used friend's rescue inhaler and feels that it has helped with this tightness sensation when present.   He does have PFT ordered by PCP, not done yet    3) Dysphagia - in past year, intermittent and stable   Reports intermittent sensation for dysphagia for solids (not liquids), food gets stuck in throat and he needs to swallow a few times in order for food to go down.   Spanning into childhood, reports he tends to over-masticate. Shares that if he took too large a bite of food, he would have to chew thoroughly and then swallow the bolus in steps. When I inquired what happens if he tries to swallow the entire bolus at once, tells me he is not sure and has not tried.   Tells me he tends to be the last one to leave the dinner table eating.   Never had issues with food impactions or had to have heimlich performed on him. Never ended up on ED for impaction. No hx of aspiration PNA.      4) Heart burn  - described as burning going up throat 1x / week with regurgitation of sour material into mouth 1-2x/wk.   Can be triggered by certain foods, at one time journaling his experience, but not able to report consistent triggers.   ?hot dogs (ate 3 in one setting) and had burning sensation.   Will take PRN tums (but infrequent) - not sure if it helps all the time     5) abd pain- started 2 year ago, noticed 1-2x a month  Crampy pain to lower abdominal region and hypogastric region. Pain tends to heralds the need to have a BM, resulting in a " looser than normal BM (describes BSC Type 6-7). On the days he has this pain, BM are more frequent too. Pain goes away with BM.   Never had blood or melena. Never had mucus in BM. No rectal pain with defecation.   Outside of these episodes, he does not report abd pain in general.     Bowel pattern (all his life)   Usually 1-2 BM a day, not coupled. Reports good evacuation, typically. BSC type 6 on average, soft and easy to pass. No straining. On toilet for 5 min to have a BM.     Weight is stable  Appetite is good    Denies fevers, chills, weight loss, nausea, emesis, odynophagia, dysphonia/hoarseness, chronic cough, neck pain/swelling/mass,  abdominal pain, bloating/fullness, post prandial bloating.     Ibuprofen - takes 1-2 tabs for headaches; about 1-2x every few months    APAP - none  No use of OTC herbal supplements/weight loss products.      Rare - etoh use  Never smoker  occ edibles, otherwise no drugs     FHx   Father - dx with stage 4 colon cancer (aged 40)   Brother (31) - allergies, had a CSP that was negative. No polyps taken   Brother (29) - allergies     No other family history or GI related malignancy (esophageal, gastric, pancreatic, liver or colon) or family history of IBD/celiac disease.     Social Hx  Works as certified ophthalmic, eye clinic     PROBLEM LIST  Patient Active Problem List    Diagnosis Date Noted     Gastroesophageal reflux disease, unspecified whether esophagitis present 02/05/2021     Priority: Medium     FH: colon cancer in relative <50 years old 02/05/2021     Priority: Medium     Palpitations 09/29/2016     Priority: Medium     PERTINENT PAST MEDICAL HISTORY:  No past medical history on file.    PREVIOUS SURGERIES:  Past Surgical History:   Procedure Laterality Date     ENT SURGERY  Adenoids     ORTHOPEDIC SURGERY  12/2017    Brandon Tooth Extraction Bilateral Lower and Upper.       ALLERGIES:     Allergies   Allergen Reactions     Penicillins      Zithromax [Azithromycin]         PERTINENT MEDICATIONS:    Current Outpatient Medications:      cetirizine (ZYRTEC) 10 MG tablet, , Disp: , Rfl:      omeprazole 20 MG tablet, , Disp: , Rfl:     SOCIAL HISTORY:  Social History     Socioeconomic History     Marital status: Single     Spouse name: Not on file     Number of children: Not on file     Years of education: Not on file     Highest education level: Not on file   Occupational History     Not on file   Tobacco Use     Smoking status: Never     Smokeless tobacco: Never     Tobacco comments:     No exposure.   Vaping Use     Vaping Use: Never used   Substance and Sexual Activity     Alcohol use: Yes     Comment: Very occasional.     Drug use: Never     Sexual activity: Yes     Partners: Female     Birth control/protection: Condom   Other Topics Concern     Parent/sibling w/ CABG, MI or angioplasty before 65F 55M? No   Social History Narrative     Not on file     Social Determinants of Health     Financial Resource Strain: Not on file   Food Insecurity: Not on file   Transportation Needs: Not on file   Physical Activity: Not on file   Stress: Not on file   Social Connections: Not on file   Intimate Partner Violence: Not on file   Housing Stability: Not on file     FAMILY HISTORY:  Family History   Problem Relation Age of Onset     Hypertension Mother      Colon Cancer Father      Hypertension Maternal Grandmother      Diabetes Maternal Grandmother         Type 2 - Very prevalent on both sides of family     Aneurysm Maternal Grandfather         Brain     Melanoma Paternal Grandfather      Prostate Cancer Paternal Grandfather      Other - See Comments Other         A-fib.     Diabetes Other         My Maternal Uncle has Type 1 diabetes     Asthma Brother         Mild and developed in pre-teens.     Past/family/social history reviewed and no changes     PHYSICAL EXAMINATION:  Vitals reviewed: There were no vitals taken for this visit.  Wt:   Wt Readings from Last 2 Encounters:   06/30/23 73 kg  (161 lb)   05/12/22 67.6 kg (149 lb)        Constitutional: aaox3, cooperative, pleasant, not dyspneic/diaphoretic, no acute distress  Eyes: Sclera anicteric/injected  Ears/nose/mouth/throat: Normal oropharynx without ulcers or exudate, mucus membranes moist, hearing intact  Neck: supple, active ROM w/o limitation or pain   CV: No edema  Respiratory: Unlabored breathing  Abd: Nondistended, +bs, no hepatosplenomegaly, nontender, no peritoneal signs, neg Santiago's sign  Skin: warm, perfused, no jaundice  Psych: Normal affect  MSK: Normal gait     PERTINENT STUDIES:    Office Visit on 06/30/2023   Component Date Value Ref Range Status     HIV Antigen Antibody Combo 06/30/2023 Nonreactive  Nonreactive Final     Hepatitis C Antibody 06/30/2023 Nonreactive  Nonreactive Final     Sodium 06/30/2023 137  136 - 145 mmol/L Final     Potassium 06/30/2023 4.1  3.4 - 5.3 mmol/L Final     Chloride 06/30/2023 98  98 - 107 mmol/L Final     Carbon Dioxide (CO2) 06/30/2023 26  22 - 29 mmol/L Final     Anion Gap 06/30/2023 13  7 - 15 mmol/L Final     Urea Nitrogen 06/30/2023 17.6  6.0 - 20.0 mg/dL Final     Creatinine 06/30/2023 0.82  0.67 - 1.17 mg/dL Final     Calcium 06/30/2023 9.9  8.6 - 10.0 mg/dL Final     Glucose 06/30/2023 91  70 - 99 mg/dL Final     Alkaline Phosphatase 06/30/2023 53  40 - 129 U/L Final     AST 06/30/2023 64 (H)  0 - 45 U/L Final     ALT 06/30/2023 48  0 - 70 U/L Final     Protein Total 06/30/2023 7.8  6.4 - 8.3 g/dL Final     Albumin 06/30/2023 4.7  3.5 - 5.2 g/dL Final     Bilirubin Total 06/30/2023 0.5  <=1.2 mg/dL Final     GFR Estimate 06/30/2023 >90  >60 mL/min/1.73m2 Final     WBC Count 06/30/2023 5.8  4.0 - 11.0 10e3/uL Final     RBC Count 06/30/2023 5.44  4.40 - 5.90 10e6/uL Final     Hemoglobin 06/30/2023 16.3  13.3 - 17.7 g/dL Final     Hematocrit 06/30/2023 47.0  40.0 - 53.0 % Final     MCV 06/30/2023 86  78 - 100 fL Final     MCH 06/30/2023 30.0  26.5 - 33.0 pg Final     MCHC 06/30/2023 34.7  31.5  - 36.5 g/dL Final     RDW 06/30/2023 12.3  10.0 - 15.0 % Final     Platelet Count 06/30/2023 228  150 - 450 10e3/uL Final     % Neutrophils 06/30/2023 61  % Final     % Lymphocytes 06/30/2023 29  % Final     % Monocytes 06/30/2023 7  % Final     % Eosinophils 06/30/2023 2  % Final     % Basophils 06/30/2023 1  % Final     % Immature Granulocytes 06/30/2023 0  % Final     Absolute Neutrophils 06/30/2023 3.6  1.6 - 8.3 10e3/uL Final     Absolute Lymphocytes 06/30/2023 1.7  0.8 - 5.3 10e3/uL Final     Absolute Monocytes 06/30/2023 0.4  0.0 - 1.3 10e3/uL Final     Absolute Eosinophils 06/30/2023 0.1  0.0 - 0.7 10e3/uL Final     Absolute Basophils 06/30/2023 0.0  0.0 - 0.2 10e3/uL Final     Absolute Immature Granulocytes 06/30/2023 0.0  <=0.4 10e3/uL Final        Lab Results   Component Value Date    WBC 5.8 06/30/2023    WBC 6.2 08/05/2016    HGB 16.3 06/30/2023    HGB 16.5 (H) 08/05/2016     06/30/2023     08/05/2016    ALT 48 06/30/2023    ALT 27 08/05/2016    AST 64 (H) 06/30/2023    AST 20 08/05/2016     06/30/2023     08/05/2016    BUN 17.6 06/30/2023    BUN 14 08/05/2016    CO2 26 06/30/2023    CO2 29 08/05/2016    TSH 2.58 08/05/2016        Helicobacter pylori Antigen Stool Negative Negative     Comment: Negative for Helicobacter pylori antigen by enzyme immunoassay. A negative result indicates the absence of H. pylori antigen or that the level of antigen is below the level of detection.       Liver Function Studies -   Recent Labs   Lab Test 06/30/23  0858   PROTTOTAL 7.8   ALBUMIN 4.7   BILITOTAL 0.5   ALKPHOS 53   AST 64*   ALT 48        PREVIOUS ENDOSCOPY    No results found for this or any previous visit.    ASSESSMENT/PLAN:  Saturnino Matthews is a 23 year old year old male with PMHx significant for seasonal alleriges and ?atopic history (likely asthma vs RAD) who presents [facility] for long standing ?GERD that has been partially responsive to  course of PPI (varying dose, better on 40 mg daily). His biggest concern is that of globus sensation that occurs daily but he also reports chest tightness and classic heartburn. On history, he also mentions intermittent solid food dysphagia and some altered bowel movements.   For work-up he has had a negative CBC, neg H. Pylori stool antigen(on PPI). CMP with slight elevation in AST.   He presents today for further evaluation.     #heartburn  #globus  #chest tightness   Presenting symptoms are most consistent with EoE vs refractory GERD however the differential for symptom constellation is broad and includes esophageal motility disorder, RAD vs asthma, PND with allergies, DGBI such as functional dyspepsia / globus. Less likely to bencardiac etiology.   -We discussed management options. I recommended EGD off PPI and H2RA. We discussed the indication for endoscopy, risks versus benefits, and potential complications.  He verbalized understanding and agrees to proceed.   -Order EGD with distal/proximal esophageal biopsies, gastric biopsies (as stool antigen was taken while on PPI), and duodenal biopsies. Anesthesia OK for conscious or MAC.   -While we await scheduling, asked that he continue PPI 20 mg daily + sodium alginate PRN   If he develops breakthrough, gave OK to increase omeprazole back to 40 mg daily   -Instructed to stop PPI and H2RA 2 weeks before EGD  -GERD friendly and lifestyle dietary recommendations per AVS  -agree with PFTs with bronchodilator, as ordered by PCP     Future consideration  1. If EGD negative, consider pH impedence vs BRAVO study  2. If EGD, consider HRM for dysmotility disorder +/- timed XR esophogram with tablet   3. ENT consult for consideration of laryngoscopy   4. Daily flonase trial with daily zyrtec and close clinical follow-up     #dysphagia   #chest tightness  Intermittent, to solids in past year in setting of an otherwise healthy 23 year old with an atopic PMhx. Presenting  symptoms are most consistent with esophagitis however the differential for dysphagia includes EoE vs GERD, esophageal dysmotility, gastritis, h.pylori  -ordering XR esophagram timed with tablet    -ordering EGD off antacids per above  -cont PPI per AVS    Future consideration  If  w/u negative  1. If EGD, consider HRM for dysmotility disorder   2.If EGD neg, consider ENT consult for consideration of laryngoscopy  3. 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 Given chronicity and in absence of N/V, unlikely to be gastroenteritis.   -EGD as above (with duodenal bx).  If duodenal bx abnormal and or if sx become more persistent, will order TTG and total IgA serology  -checking giardia/cryptosoporidum and O&P. No use of abx, so will hold c.diff testing  -start fiber, per AVS   -no other systemic sx to suggest inflammatory disease, but if he drops weight or if sx continue despite conservative approaches, will plan for diagnostic CSP with random colon bx and +/- imaging to help guide endoscopy and management   -low threshold for diagnostic CSP given stage 4 CRC in father at aged 40      Future consideration   1. RD consult for low FODMAP diet  2. Trial of IB reese   3. Diagnostic CSP as per above     #elevated AST  Very mild with otherwise normal liver enzymes. No APAP. Infrequent ETOH use. Consistent with transient elevation in liver enzyme, ?med induced. ddx includes hepatitis    -repeat in a few months, if still abnormal will plan to follow with RUQ US and viral Hep B/C panels, to start     Orders Placed This Encounter   Procedures     XR Esophagram     Hepatic panel     Adult GI  Referral - Procedure Only     Colorectal cancer screening: aged 30, unless otherwise indicated     RTC 3 mo, or otherwise clinically indicated   Can RTC to see our GI team at [facility] or GI at [facility] (which is closer to him)     Thank you for this consultation.  It was a pleasure to participate in the care of this patient; please contact me with any further questions.     Chely Blue PA-C    Follow up: As planned above. Today, I personally spent 60 minutes in direct face to face time with the patient, of which greater than 50% of the time was spent in patient education and counseling as described above. Approximately  minutes were spent on indirect care associated with the patient's consultation including but not limited to review of: patient medical records to date, clinic visits, hospital records, lab results, imaging studies, procedural documentation, and coordinating care with other providers. The findings from this review are summarized in the above note. All of the above accounted for a cumulative time of 60 minutes and was performed on the date of service.

## 2023-07-14 ENCOUNTER — TELEPHONE (OUTPATIENT)
Dept: GASTROENTEROLOGY | Facility: CLINIC | Age: 23
End: 2023-07-14

## 2023-07-14 ENCOUNTER — APPOINTMENT (OUTPATIENT)
Dept: LAB | Facility: CLINIC | Age: 23
End: 2023-07-14
Payer: COMMERCIAL

## 2023-07-14 PROCEDURE — 87209 SMEAR COMPLEX STAIN: CPT | Performed by: PHYSICIAN ASSISTANT

## 2023-07-14 PROCEDURE — 99000 SPECIMEN HANDLING OFFICE-LAB: CPT | Performed by: PATHOLOGY

## 2023-07-14 PROCEDURE — 87329 GIARDIA AG IA: CPT | Performed by: PHYSICIAN ASSISTANT

## 2023-07-14 NOTE — TELEPHONE ENCOUNTER
"Endoscopy Scheduling Screen    Have you had a positive Covid test in the last 14 days?  No    Are you active on MyChart?   Yes    What insurance is in the chart?  BC/BS: Schedule in [facility] unless patient meets exclusion criteria.     Ordering/Referring Provider: FORD FISHER   (If ordering provider performs procedure, schedule with ordering provider unless otherwise instructed. )    BMI: Estimated body mass index is 23.14 kg/m  as calculated from the following:    Height as of 7/13/23: 1.765 m (5' 9.5\").    Weight as of 7/13/23: 72.1 kg (159 lb).     Sedation Ordered  moderate sedation.   If patient BMI > 50 do not schedule in [facility].    Are you taking any prescription medications for pain?   No    Are you taking methadone or Suboxone?  No    Do you have a history of malignant hyperthermia or adverse reaction to anesthesia?  No    (Females) Are you currently pregnant?   No     Have you been diagnosed or told you have pulmonary hypertension?   No    Do you have an LVAD?  No    Have you been told you have moderate to severe sleep apnea?  No    Have you been told you have COPD, asthma, or any other lung disease?  No     Do you have any heart conditions?  No     Have you ever had or are you awaiting a heart or lung transplant?   No    Have you had a stroke or transient ischemic attack (TIA aka \"mini stroke\" in the last 6 months?   No    Have you been diagnosed with or been told you have cirrhosis of the liver?   No    Are you currently on dialysis?   No    Do you need assistance transferring?   No    BMI: Estimated body mass index is 23.14 kg/m  as calculated from the following:    Height as of 7/13/23: 1.765 m (5' 9.5\").    Weight as of 7/13/23: 72.1 kg (159 lb).     Is patients BMI > 40 and scheduling location [facility]?  No    Do you take the medication Phentermine, Ozempic or Wegovy?  No    Do you take the medication Naltrexone?  No    Do you take blood thinners?  No      Prep   Are you currently on dialysis or do you have " chronic kidney disease?  No    Do you have a diagnosis of diabetes?  No    Do you have a diagnosis of cystic fibrosis (CF)?  No    On a regular basis do you go 3 -5 days between bowel movements?  No    BMI > 40?  No    Preferred Pharmacy:    [id number] - [facility] [address]  [address] [facility] [address]  Phone: [phone] Fax: [phone]    Final Scheduling Details   Colonoscopy prep sent?  N/A    Procedure scheduled  Upper endoscopy (EGD)    Surgeon:  CLAIR     Date of procedure:  8/23/23     Schedule PAC:   No    Location  [facility]    Sedation   Moderate Sedation    Patient Reminders:    You will receive a call from a Nurse to review instructions and health history.  This assessment must be completed prior to your procedure.  Failure to complete the Nurse assessment may result in the procedure being cancelled.       On the day of your procedure, please designate an adult(s) who can drive you home stay with you for the next 24 hours. The medicines used in the exam will make you sleepy. You will not be able to drive.       You cannot take public transportation, ride share services, or non-medical taxi service without a responsible caregiver.  Medical transport services are allowed with the requirement that a responsible caregiver will receive you at your destination.  We require that drivers and caregivers are confirmed prior to your procedure.

## 2023-07-17 LAB
C PARVUM AG STL QL IA: NEGATIVE
G LAMBLIA AG STL QL IA: NEGATIVE
O+P STL MICRO: NEGATIVE

## 2023-07-18 ENCOUNTER — ANCILLARY PROCEDURE (OUTPATIENT)
Dept: GENERAL RADIOLOGY | Facility: CLINIC | Age: 23
End: 2023-07-18
Attending: PHYSICIAN ASSISTANT
Payer: COMMERCIAL

## 2023-07-18 DIAGNOSIS — R13.19 ESOPHAGEAL DYSPHAGIA: ICD-10-CM

## 2023-07-18 PROCEDURE — 74220 X-RAY XM ESOPHAGUS 1CNTRST: CPT | Mod: GC | Performed by: RADIOLOGY

## 2023-07-24 ENCOUNTER — TELEPHONE (OUTPATIENT)
Dept: GASTROENTEROLOGY | Facility: CLINIC | Age: 23
End: 2023-07-24
Payer: COMMERCIAL

## 2023-07-24 NOTE — TELEPHONE ENCOUNTER
JAYDENM and sent mychart for patient to schedule:    3 mo follow-up with Chely Blue or Gen MICHAEL (around 10/17/23). [facility] GI, can be in person or virtual.

## 2023-08-07 ENCOUNTER — TELEPHONE (OUTPATIENT)
Dept: GASTROENTEROLOGY | Facility: CLINIC | Age: 23
End: 2023-08-07
Payer: COMMERCIAL

## 2023-08-07 NOTE — TELEPHONE ENCOUNTER
Attempted to contact patient in order to complete pre assessment questions.     No answer. Left message to return call to [phone] option 4      Procedure details:    Patient scheduled for Upper endoscopy (EGD) on 8/23/23.     Arrival time: 0945. Procedure time 1045    Pre op exam needed? N/A    [facility] [address] [place] [address], [address] Floor, [place] [address]    Sedation type: Conscious sedation     Indication for procedure:     refractory reflux x 6-7 years, multiple trials of PPI. please obtain distal and proximal esophageal bx, gastric bx, and duodenal biopsies         Chart review:     Electronic implanted devices? No    Diabetic? No      Medication review:    Anticoagulants? No    NSAIDS? No    Other medication HOLDING recommendations:  N/A      Prep for procedure:     Bowel prep recommendation: N/A     Prep instructions sent via [employer].       India Carr RN  Endoscopy Procedure Pre Assessment RN

## 2023-08-07 NOTE — TELEPHONE ENCOUNTER
Pre assessment completed for upcoming procedure.   (Please see previous telephone encounter notes for complete details)    Patient  returned call.       Procedure details:    Arrival time and facility location reviewed    Pre op exam needed? N/A    Designated  policy reviewed. Instructed to have someone stay 6 hours post procedure.     COVID policy reviewed.      Medication review:    Medications reviewed. Please see supporting documentation below. Holding recommendations discussed (if applicable).       Prep for procedure:     Reviewed procedure prep instructions.       Additional information needed?  N/A      Patient  verbalized understanding and had no questions or concerns at this time.      India Benavides RN  Endoscopy Procedure Pre Assessment RN  [phone] option 4

## 2023-08-23 ENCOUNTER — HOSPITAL ENCOUNTER (OUTPATIENT)
Facility: AMBULATORY SURGERY CENTER | Age: 23
Discharge: HOME | End: 2023-08-23
Attending: INTERNAL MEDICINE | Admitting: INTERNAL MEDICINE
Payer: COMMERCIAL

## 2023-08-23 ENCOUNTER — ANESTHESIA (OUTPATIENT)
Dept: SURGERY | Facility: AMBULATORY SURGERY CENTER | Age: 23
End: 2023-08-23
Payer: COMMERCIAL

## 2023-08-23 ENCOUNTER — ANESTHESIA EVENT (OUTPATIENT)
Dept: SURGERY | Facility: AMBULATORY SURGERY CENTER | Age: 23
End: 2023-08-23
Payer: COMMERCIAL

## 2023-08-23 VITALS
RESPIRATION RATE: 16 BRPM | HEART RATE: 59 BPM | HEIGHT: 70 IN | TEMPERATURE: 97 F | OXYGEN SATURATION: 97 % | DIASTOLIC BLOOD PRESSURE: 62 MMHG | SYSTOLIC BLOOD PRESSURE: 125 MMHG | WEIGHT: 159 LBS | BODY MASS INDEX: 22.76 KG/M2

## 2023-08-23 VITALS — HEART RATE: 86 BPM

## 2023-08-23 LAB — UPPER GI ENDOSCOPY: NORMAL

## 2023-08-23 PROCEDURE — 43239 EGD BIOPSY SINGLE/MULTIPLE: CPT

## 2023-08-23 PROCEDURE — 88305 TISSUE EXAM BY PATHOLOGIST: CPT | Mod: 26 | Performed by: PATHOLOGY

## 2023-08-23 PROCEDURE — 88305 TISSUE EXAM BY PATHOLOGIST: CPT | Mod: TC | Performed by: INTERNAL MEDICINE

## 2023-08-23 RX ORDER — SODIUM CHLORIDE, SODIUM LACTATE, POTASSIUM CHLORIDE, CALCIUM CHLORIDE 600; 310; 30; 20 MG/100ML; MG/100ML; MG/100ML; MG/100ML
INJECTION, SOLUTION INTRAVENOUS CONTINUOUS PRN
Status: DISCONTINUED | OUTPATIENT
Start: 2023-08-23 | End: 2023-08-23

## 2023-08-23 RX ORDER — PROPOFOL 10 MG/ML
INJECTION, EMULSION INTRAVENOUS CONTINUOUS PRN
Status: DISCONTINUED | OUTPATIENT
Start: 2023-08-23 | End: 2023-08-23

## 2023-08-23 RX ORDER — NALOXONE HYDROCHLORIDE 0.4 MG/ML
0.4 INJECTION, SOLUTION INTRAMUSCULAR; INTRAVENOUS; SUBCUTANEOUS
Status: DISCONTINUED | OUTPATIENT
Start: 2023-08-23 | End: 2023-08-24 | Stop reason: HOSPADM

## 2023-08-23 RX ORDER — NALOXONE HYDROCHLORIDE 0.4 MG/ML
0.2 INJECTION, SOLUTION INTRAMUSCULAR; INTRAVENOUS; SUBCUTANEOUS
Status: DISCONTINUED | OUTPATIENT
Start: 2023-08-23 | End: 2023-08-24 | Stop reason: HOSPADM

## 2023-08-23 RX ORDER — PROPOFOL 10 MG/ML
INJECTION, EMULSION INTRAVENOUS PRN
Status: DISCONTINUED | OUTPATIENT
Start: 2023-08-23 | End: 2023-08-23

## 2023-08-23 RX ORDER — ONDANSETRON 4 MG/1
4 TABLET, ORALLY DISINTEGRATING ORAL EVERY 6 HOURS PRN
Status: DISCONTINUED | OUTPATIENT
Start: 2023-08-23 | End: 2023-08-24 | Stop reason: HOSPADM

## 2023-08-23 RX ORDER — GLYCOPYRROLATE 0.2 MG/ML
INJECTION, SOLUTION INTRAMUSCULAR; INTRAVENOUS PRN
Status: DISCONTINUED | OUTPATIENT
Start: 2023-08-23 | End: 2023-08-23

## 2023-08-23 RX ORDER — LIDOCAINE 40 MG/G
CREAM TOPICAL
Status: DISCONTINUED | OUTPATIENT
Start: 2023-08-23 | End: 2023-08-23 | Stop reason: HOSPADM

## 2023-08-23 RX ORDER — ONDANSETRON 2 MG/ML
4 INJECTION INTRAMUSCULAR; INTRAVENOUS
Status: DISCONTINUED | OUTPATIENT
Start: 2023-08-23 | End: 2023-08-23 | Stop reason: HOSPADM

## 2023-08-23 RX ORDER — LIDOCAINE HYDROCHLORIDE 20 MG/ML
INJECTION, SOLUTION INFILTRATION; PERINEURAL PRN
Status: DISCONTINUED | OUTPATIENT
Start: 2023-08-23 | End: 2023-08-23

## 2023-08-23 RX ORDER — FLUMAZENIL 0.1 MG/ML
0.2 INJECTION, SOLUTION INTRAVENOUS
Status: ACTIVE | OUTPATIENT
Start: 2023-08-23 | End: 2023-08-23

## 2023-08-23 RX ORDER — ONDANSETRON 2 MG/ML
4 INJECTION INTRAMUSCULAR; INTRAVENOUS EVERY 6 HOURS PRN
Status: DISCONTINUED | OUTPATIENT
Start: 2023-08-23 | End: 2023-08-24 | Stop reason: HOSPADM

## 2023-08-23 RX ORDER — PROCHLORPERAZINE MALEATE 10 MG
10 TABLET ORAL EVERY 6 HOURS PRN
Status: DISCONTINUED | OUTPATIENT
Start: 2023-08-23 | End: 2023-08-24 | Stop reason: HOSPADM

## 2023-08-23 RX ADMIN — PROPOFOL 100 MG: 10 INJECTION, EMULSION INTRAVENOUS at 11:04

## 2023-08-23 RX ADMIN — PROPOFOL 200 MCG/KG/MIN: 10 INJECTION, EMULSION INTRAVENOUS at 11:04

## 2023-08-23 RX ADMIN — PROPOFOL 50 MG: 10 INJECTION, EMULSION INTRAVENOUS at 11:08

## 2023-08-23 RX ADMIN — LIDOCAINE HYDROCHLORIDE 60 MG: 20 INJECTION, SOLUTION INFILTRATION; PERINEURAL at 11:04

## 2023-08-23 RX ADMIN — GLYCOPYRROLATE 0.2 MG: 0.2 INJECTION, SOLUTION INTRAMUSCULAR; INTRAVENOUS at 11:03

## 2023-08-23 RX ADMIN — SODIUM CHLORIDE, SODIUM LACTATE, POTASSIUM CHLORIDE, CALCIUM CHLORIDE: 600; 310; 30; 20 INJECTION, SOLUTION INTRAVENOUS at 11:03

## 2023-08-23 NOTE — H&P
"Gastroenterology Pre-op History and Physical    Saturnino Matthews MRN# [id number]   Age: 23 year old YOB: 2000      Date of Surgery: 08/23/23  [facility]      Date of Exam 8/23/2023 Facility Same Day       Primary care provider: Víctor - ARAM Sharif [facility]         Chief Complaint and/or Reason for Procedure:   23M with chronic heartburn resolved with PPI compliance over the past year, with occasional solid food dysphagia at sternal notch.  Reports occasional diarrhea.  Father with CRC at age 40.             Past Medical and Surgical History:     No past medical history on file.  Past Surgical History:   Procedure Laterality Date    ENT SURGERY  Adenoids    ORTHOPEDIC SURGERY  12/2017    [place] Tooth Extraction Bilateral Lower and Upper.            Medications (include herbals and vitamins):        (Not in a hospital admission)            Allergies:      Allergies   Allergen Reactions    Penicillins     Zithromax [Azithromycin]                Physical Exam:   All vitals have been reviewed  Patient Vitals for the past 8 hrs:   BP Temp Temp src Pulse Resp SpO2 Height Weight   08/23/23 1010 133/69 97.2  F (36.2  C) Temporal 67 16 97 % 1.765 m (5' 9.5\") 72.1 kg (159 lb)     No intake/output data recorded.  Airway assessment:   Patient is able to open mouth wide  Patient is able to stick out tongue  Mallampatti classification: Class II (visualization of the soft palate, fauces, and uvula)}      ENT:   Normocephalic, without obvious abnormality, atraumatic, sinuses nontender on palpation, external ears without lesions, oral pharynx with moist mucous membranes, tonsils without erythema or exudates, gums normal and good dentition.     Lungs:   No increased work of breathing, good air exchange, clear to auscultation bilaterally, no crackles or wheezing     Cardiovascular:   regular rate and rhythm                 Anesthetic risk and/or ASA classification: 1   23M " with solid food dysphagia and occasional diarrhea for EGD with duodenal bx for celiac, possible other bx as indicated.      Naomi Hamilton MD

## 2023-08-23 NOTE — ANESTHESIA CARE TRANSFER NOTE
Patient: Saturnino Matthews    Procedure: Procedure(s):  ESOPHAGOGASTRODUODEOSCOPY WITH BIOPSY       Diagnosis: Gastroesophageal reflux disease, unspecified whether esophagitis present [K21.9]  Diagnosis Additional Information: No value filed.    Anesthesia Type:   MAC     Note:    Oropharynx: oropharynx clear of all foreign objects  Level of Consciousness: drowsy  Oxygen Supplementation: room air    Independent Airway: airway patency satisfactory and stable  Dentition: dentition unchanged  Vital Signs Stable: post-procedure vital signs reviewed and stable  Report to RN Given: handoff report given  Patient transferred to: Phase II    Handoff Report: Identifed the Patient, Identified the Reponsible Provider, Reviewed the pertinent medical history, Discussed the surgical course, Reviewed Intra-OP anesthesia mangement and issues during anesthesia, Set expectations for post-procedure period and Allowed opportunity for questions and acknowledgement of understanding      Vitals:  Vitals Value Taken Time   /53 08/23/23 1119   Temp 36  C (96.8  F) 08/23/23 1119   Pulse 76 08/23/23 1119   Resp 16 08/23/23 1119   SpO2 95 % 08/23/23 1119       Electronically Signed By: LUC Corona CRNA  August 23, 2023  11:20 AM

## 2023-08-23 NOTE — ANESTHESIA PREPROCEDURE EVALUATION
Anesthesia Pre-Procedure Evaluation    Patient: Saturnino Matthews   MRN: [id number] : 2000        Procedure : Procedure(s):  Esophagoscopy, gastroscopy, duodenoscopy (EGD), combined          No past medical history on file.   Past Surgical History:   Procedure Laterality Date    ENT SURGERY  Adenoids    ORTHOPEDIC SURGERY  2017    [place] Tooth Extraction Bilateral Lower and Upper.      Allergies   Allergen Reactions    Penicillins     Zithromax [Azithromycin]       Social History     Tobacco Use    Smoking status: Never    Smokeless tobacco: Never    Tobacco comments:     No exposure.   Substance Use Topics    Alcohol use: Yes     Comment: Very occasional.      Wt Readings from Last 1 Encounters:   23 72.1 kg (159 lb)        Anesthesia Evaluation   Pt has had prior anesthetic.     No history of anesthetic complications       ROS/MED HX  ENT/Pulmonary: Comment: TMJ      Neurologic:  - neg neurologic ROS     Cardiovascular:     (+)  - -   -  - -                          Irregular Heartbeat/Palpitations,            METS/Exercise Tolerance:     Hematologic:       Musculoskeletal:       GI/Hepatic:     (+) GERD, Asymptomatic on medication,                  Renal/Genitourinary:  - neg Renal ROS     Endo:  - neg endo ROS     Psychiatric/Substance Use:  - neg psychiatric ROS     Infectious Disease:  - neg infectious disease ROS     Malignancy:       Other:            Physical Exam    Airway        Mallampati: I   TM distance: > 3 FB   Neck ROM: full   Mouth opening: > 3 cm    Respiratory Devices and Support         Dental       (+) Completely normal teeth      Cardiovascular             Pulmonary                   OUTSIDE LABS:  CBC:   Lab Results   Component Value Date    WBC 5.8 2023    WBC 6.2 2016    HGB 16.3 2023    HGB 16.5 (H) 2016    HCT 47.0 2023    HCT 47.9 (H) 2016     2023     2016     BMP:   Lab Results   Component Value Date    NA  137 06/30/2023     08/05/2016    POTASSIUM 4.1 06/30/2023    POTASSIUM 4.0 08/05/2016    CHLORIDE 98 06/30/2023    CHLORIDE 102 08/05/2016    CO2 26 06/30/2023    CO2 29 08/05/2016    BUN 17.6 06/30/2023    BUN 14 08/05/2016    CR 0.82 06/30/2023    CR 0.73 08/05/2016    GLC 91 06/30/2023    GLC 75 08/05/2016     COAGS: No results found for: PTT, INR, FIBR  POC: No results found for: BGM, HCG, HCGS  HEPATIC:   Lab Results   Component Value Date    ALBUMIN 4.9 07/13/2023    PROTTOTAL 8.2 07/13/2023    ALT 46 07/13/2023    AST 28 07/13/2023    ALKPHOS 62 07/13/2023    BILITOTAL 0.7 07/13/2023     OTHER:   Lab Results   Component Value Date    SAWYER 9.9 06/30/2023    TSH 2.58 08/05/2016    T4 1.20 08/05/2016       Anesthesia Plan    ASA Status:  2    NPO Status:  NPO Appropriate    Anesthesia Type: MAC.     - Reason for MAC: straight local not clinically adequate   Induction: Intravenous, Propofol.   Maintenance: TIVA.        Consents    Anesthesia Plan(s) and associated risks, benefits, and realistic alternatives discussed. Questions answered and patient/representative(s) expressed understanding.     - Discussed:     - Discussed with:  Patient            Postoperative Care       PONV prophylaxis: Background Propofol Infusion     Comments:                Quinn Parker MD

## 2023-08-23 NOTE — ANESTHESIA POSTPROCEDURE EVALUATION
Patient: Saturnino Matthews    Procedure: Procedure(s):  ESOPHAGOGASTRODUODEOSCOPY WITH BIOPSY       Anesthesia Type:  MAC    Note:  Disposition: Outpatient   Postop Pain Control: Uneventful            Sign Out: Well controlled pain   PONV: No   Neuro/Psych: Uneventful            Sign Out: Acceptable/Baseline neuro status   Airway/Respiratory: Uneventful            Sign Out: Acceptable/Baseline resp. status   CV/Hemodynamics: Uneventful            Sign Out: Acceptable CV status; No obvious hypovolemia; No obvious fluid overload   Other NRE: NONE   DID A NON-ROUTINE EVENT OCCUR? No           Last vitals:  Vitals Value Taken Time   /54 08/23/23 1145   Temp 36.1  C (97  F) 08/23/23 1145   Pulse 61 08/23/23 1145   Resp 15 08/23/23 1145   SpO2 96 % 08/23/23 1145       Electronically Signed By: Quinn Parker MD  August 23, 2023  12:02 PM

## 2023-08-24 LAB
PATH REPORT.COMMENTS IMP SPEC: NORMAL
PATH REPORT.COMMENTS IMP SPEC: NORMAL
PATH REPORT.FINAL DX SPEC: NORMAL
PATH REPORT.GROSS SPEC: NORMAL
PATH REPORT.MICROSCOPIC SPEC OTHER STN: NORMAL
PATH REPORT.RELEVANT HX SPEC: NORMAL
PHOTO IMAGE: NORMAL

## 2023-08-28 DIAGNOSIS — R12 HEARTBURN: Primary | ICD-10-CM

## 2023-09-28 NOTE — TELEPHONE ENCOUNTER
RECORDS RECEIVED FROM: Internal   REASON FOR VISIT: Headaches    Date of Appt: 12/21/2023   NOTES (FOR ALL VISITS) STATUS DETAILS   OFFICE NOTE from referring provider Internal 06/30/2023 Dr Mirna EUGENE    OFFICE NOTE from other specialist N/A    DISCHARGE SUMMARY from hospital N/A    DISCHARGE REPORT from the ER N/A    OPERATIVE REPORT N/A    REID Virus Labs (MS ONLY) N/A    EMG N/A    EEG N/A    MEDICATION LIST N/A    IMAGING  (FOR ALL VISITS)     LUMBAR PUNCTURE N/A    MARTHA SCAN (MOVEMENT) N/A    ULTRASOUND (CAROTID BILAT) *VASCULAR* N/A    MRI (HEAD, NECK, SPINE) N/A    CT (HEAD, NECK, SPINE) N/A

## 2023-10-02 ENCOUNTER — TELEPHONE (OUTPATIENT)
Dept: GASTROENTEROLOGY | Facility: CLINIC | Age: 23
End: 2023-10-02
Payer: COMMERCIAL

## 2023-10-02 DIAGNOSIS — K44.9 HIATAL HERNIA: Primary | ICD-10-CM

## 2023-10-02 NOTE — TELEPHONE ENCOUNTER
[facility] Call Center    Phone Message    May a detailed message be left on voicemail: yes     Reason for Call: Order(s): Other:   Reason for requested: Referral to Gen Surg   Date needed: as soon as possible  Provider name: JOY Owen    Pt is requesting a referral to Gen Surg.  Please review and contact patient    Action Taken: Message routed to:  [facility] GI    Travel Screening: Not Applicable

## 2023-10-03 ENCOUNTER — PATIENT OUTREACH (OUTPATIENT)
Dept: ONCOLOGY | Facility: CLINIC | Age: 23
End: 2023-10-03
Payer: COMMERCIAL

## 2023-10-03 DIAGNOSIS — K44.9 HIATAL HERNIA: Primary | ICD-10-CM

## 2023-10-03 NOTE — TELEPHONE ENCOUNTER
Spoke with Jorge L to let him know the referral was placed with surgery and a referral was placed for manometry. Number provided to schedule the manometry.   Follow up appointment with Chely HURLEY on 10-31

## 2023-10-03 NOTE — TELEPHONE ENCOUNTER
OK to get thoracic surgery consult in parallel but he will need appt with our team to review results.     Will need high res manometry if he's wishing to pursue surgery. I've put in the order.     Thanks  sy

## 2023-10-03 NOTE — PROGRESS NOTES
New Patient Oncology Nurse Navigator Note     Referring provider: Chely Blue PA-C    Referring Clinic/Organization: [facility]  Referred to: Thoracic Surgery  Requested provider (if applicable): First available - did not specify   Referral Received: 10/03/23       Evaluation for :   Diagnosis   K44.9 (ICD-10-CM) - Hiatal hernia   Note:  Additional Information:  23 yM with hiatal hernia, he is interested in starting discussion on anti-reflux surgical options     Clinical History (per Nurse review of records provided):      08/23/2023 Upper GI Endoscopy (bookmarked) showed:   Impression:            - LA Grade A reflux esophagitis with no bleeding.                          - Medium-sized hiatal hernia.                          - Normal examined duodenum. Biopsied for Celiac given                          clinical history.                          - The likely cause of swallowing difficulty is 1) acid                          reflux (GERD) or 2) 3cm hiatal hernia.     Clinical Assessment / Barriers to Care (Per Nurse):    Never smoker  Records Location: [facility]   Records Needed: None  Additional testing needed prior to consult: CT Chest    TASIA CelesteN, RN, OCN  [facility] Oncology Nurse Navigator  [phone] / [phone]

## 2023-10-17 ENCOUNTER — PATIENT OUTREACH (OUTPATIENT)
Dept: GASTROENTEROLOGY | Facility: CLINIC | Age: 23
End: 2023-10-17
Payer: COMMERCIAL

## 2023-10-17 NOTE — TELEPHONE ENCOUNTER
Left message for pt to call back with any questions regarding manometry and pH testing. [employer] [facility] message as well.

## 2023-10-18 DIAGNOSIS — J30.2 SEASONAL ALLERGIC RHINITIS, UNSPECIFIED TRIGGER: Primary | ICD-10-CM

## 2023-10-18 NOTE — PROGRESS NOTES
Patient requested referral to allergist.     Zyrtec over the last year with not significant relief.     Mike Santos PA-C

## 2023-10-20 NOTE — TELEPHONE ENCOUNTER
RECORDS STATUS - ALL OTHER DIAGNOSIS      RECORDS RECEIVED FROM: [facility]   DATE RECEIVED:    NOTES STATUS DETAILS   OFFICE NOTE from referring provider Epic 7/13/23: Chely Blue PA-C   OPERATIVE REPORT [facility] 8/23/23: Upper EGD   MEDICATION LIST [facility]    LABS     PATHOLOGY REPORTS Report in Epic 8/23/23: [id number]    ANYTHING RELATED TO DIAGNOSIS Epic 714/23   IMAGING (NEED IMAGES & REPORT)     CT SCANS (Scheduled on 11/1/23) 11/1/23: CT Chest   XRAYS PACS 6/30/23: XR Chest   ULTRASOUND PACS 7/18/23: XR Esophagram

## 2023-10-25 ENCOUNTER — OFFICE VISIT (OUTPATIENT)
Dept: GASTROENTEROLOGY | Facility: CLINIC | Age: 23
End: 2023-10-25
Payer: COMMERCIAL

## 2023-10-25 VITALS
DIASTOLIC BLOOD PRESSURE: 79 MMHG | HEART RATE: 80 BPM | HEIGHT: 70 IN | WEIGHT: 159 LBS | RESPIRATION RATE: 16 BRPM | BODY MASS INDEX: 22.76 KG/M2 | SYSTOLIC BLOOD PRESSURE: 139 MMHG | OXYGEN SATURATION: 98 %

## 2023-10-25 DIAGNOSIS — K44.9 HIATAL HERNIA: ICD-10-CM

## 2023-10-25 PROCEDURE — 91010 ESOPHAGUS MOTILITY STUDY: CPT | Performed by: PHYSICIAN ASSISTANT

## 2023-10-25 PROCEDURE — 91037 ESOPH IMPED FUNCTION TEST: CPT | Performed by: PHYSICIAN ASSISTANT

## 2023-10-25 ASSESSMENT — PAIN SCALES - GENERAL: PAINLEVEL: MILD PAIN (2)

## 2023-10-25 NOTE — PROGRESS NOTES
Non-Invasive GI Procedure Visit    Saturnino Matthews is a 23 year old male with history of Hiatal hernia.   Patient stated reason for procedure: Regurgitation, GERD, and Pre-surgical Evaluation      COVID-19 PCR Results           No data to display              COVID-19 Antibody Results, Testing for Immunity           No data to display                Pre-Procedure Assessment  Patient presents to clinic today for Esophageal Manometry Study    Referring Provider: Chely Blue PA-C  Patient has undergone previous endoscopy.    Does patient report taking a PPI (omeprazole, pantoprazole, rabeprazole, lansoprazole, esomeprazole, dexlansoprazole)? Yes. Is patient taking a PPI twice daily? No - stopped last week for pH testing  Does patient report taking a H2 blocker (ranitidine, or famotidine)? No  Does patient report taking opioids? No  Patient reported that last food and/or drink was last consumed 16 hours ago.  Esophageal Questionnaire(s) Completed: Yes - Esophageal Questionnaire(s)    BEDQ Questionnaire      10/24/2023     9:26 AM   BEDQ Questionnaire: How Often Have You Had the Following?   Trouble eating solid food (meat, bread, vegetables) 0   Trouble eating soft foods (yogurt, jello, pudding) 0   Trouble swallowing liquids 0   Pain while swallowing 0   Coughing or choking while swallowing foods or liquids 0   Total Score: 0         10/24/2023     9:26 AM   BEDQ Questionnaire: Discomfort/Pain Ratings   Eating solid food (meat, bread, vegetables) 0   Eating soft foods (yogurt, jello, pudding) 0   Drinking liquid 0   Total Score: 0       Eckardt Questionnaire      10/24/2023     9:29 AM   Eckardt Questionnaire   Dysphagia 1   Regurgitation 1   Retrosternal Pain 1   Weight Loss (kg) 0   Total Score:  3       Promis 10 Questionnaire      10/24/2023     9:30 AM   PROMIS 10 FLOWSHEET DATA   In general, would you say your health is: 4   In general, would you say your quality of life is: 4   In general, how would you rate  "your physical health? 4   In general, how would you rate your mental health, including your mood and your ability to think? 3   In general, how would you rate your satisfaction with your social activities and relationships? 5   In general, please rate how well you carry out your usual social activities and roles. (This includes activities at home, at work and in your community, and responsibilities as a parent, child, spouse, employee, friend, etc.) 4   To what extent are you able to carry out your everyday physical activities such as walking, climbing stairs, carrying groceries, or moving a chair? 5   In the past 7 days, how often have you been bothered by emotional problems such as feeling anxious, depressed, or irritable? 2   In the past 7 days, how would you rate your fatigue on average? 2   In the past 7 days, how would you rate your pain on average, where 0 means no pain, and 10 means worst imaginable pain? 2   Mental health question re-calculation - no clinical value 4   Physical health question re-calculation - no clinical value 4   Pain question re-calculation - no clinical value 4   Global Mental Health Score 16   Global Physical Health Score 17   PROMIS TOTAL - SUBSCORES 33   .    Patient Hx  Patient's history, medications and allergies were reviewed.     Height: 5' 9.5\"   Weight: 159 lbs 0 oz    Patient Active Problem List    Diagnosis Date Noted    Gastroesophageal reflux disease, unspecified whether esophagitis present 02/05/2021     Priority: Medium    FH: colon cancer in relative <50 years old 02/05/2021     Priority: Medium    Palpitations 09/29/2016     Priority: Medium      Prior to Admission medications    Medication Sig Start Date End Date Taking? Authorizing Provider   cetirizine (ZYRTEC) 10 MG tablet  5/1/23   Reported, Patient   omeprazole 20 MG tablet  3/18/22   Reported, Patient     Allergies   Allergen Reactions    Penicillins     Zithromax [Azithromycin]      No past medical history on " file.  Past Surgical History:   Procedure Laterality Date    ENT SURGERY  Adenoids    ESOPHAGOSCOPY, GASTROSCOPY, DUODENOSCOPY (EGD), COMBINED N/A 8/23/2023    Procedure: ESOPHAGOGASTRODUODEOSCOPY WITH BIOPSY;  Surgeon: Naomi Hamilton MD;  Location: [facility] OR    ORTHOPEDIC SURGERY  12/2017    [place] Tooth Extraction Bilateral Lower and Upper.     Family History   Problem Relation Age of Onset    Hypertension Mother     Colon Cancer Father 48    Hypertension Maternal Grandmother     Diabetes Maternal Grandmother         Type 2 - Very prevalent on both sides of family    Aneurysm Maternal Grandfather         Brain    Melanoma Paternal Grandfather     Prostate Cancer Paternal Grandfather     Other - See Comments Other         A-fib.    Diabetes Other         My Maternal Uncle has Type 1 diabetes    Asthma Brother         Mild and developed in pre-teens.     Social History     Tobacco Use    Smoking status: Never    Smokeless tobacco: Never    Tobacco comments:     No exposure.   Substance Use Topics    Alcohol use: Yes     Comment: Very occasional.        Pre-Procedure Education & Consent  Procedure education was provided to: Patient  Teaching method: Explanation  Barriers to learning: No Barrier    Patient indicated understanding of pre-procedure instruction and appropriate consent was obtained and documented.    ____________________________________________________________________    Post-Procedure Documentation: Esophageal Manometry    Manometry catheter was placed via right nare to 53 cm and normal saline swallows given per protocol. Manometry catheter was removed at the end of test.    Discharge instructions given to patient.    Notification of pending test results sent to provider for interpretation. Please reference scanned document for final interpretation of results. Patient will follow up with referring provider for test results.    Gely Stanley RN on 10/25/2023 at 1:44 PM

## 2023-10-25 NOTE — PATIENT INSTRUCTIONS
Esophogeal Manometry Study  1. Resume regular diet.  2. You may have a bloody nose or sore throat after the procedure.  3. If you have questions call [phone] from 7:00am-5:00pm.  For afterhours questions call GI doctor on call at [phone].

## 2023-10-31 ENCOUNTER — VIRTUAL VISIT (OUTPATIENT)
Dept: GASTROENTEROLOGY | Facility: CLINIC | Age: 23
End: 2023-10-31
Payer: COMMERCIAL

## 2023-10-31 DIAGNOSIS — K44.9 HIATAL HERNIA: ICD-10-CM

## 2023-10-31 DIAGNOSIS — R13.19 ESOPHAGEAL DYSPHAGIA: ICD-10-CM

## 2023-10-31 DIAGNOSIS — K22.10 EROSIVE ESOPHAGITIS: Primary | ICD-10-CM

## 2023-10-31 DIAGNOSIS — R10.13 EPIGASTRIC PAIN: ICD-10-CM

## 2023-10-31 PROCEDURE — 99214 OFFICE O/P EST MOD 30 MIN: CPT | Mod: VID | Performed by: PHYSICIAN ASSISTANT

## 2023-10-31 NOTE — LETTER
10/31/2023         RE: Saturnino Matthews  [address] [facility] [address]        Dear Colleague,    Thank you for referring your patient, Saturnino Matthews, to the [facility] [place]. Please see a copy of my visit note below.      GI CLINIC VISIT    Virtual Visit Details    Type of service:  Video Visit     Originating Location (pt. Location): Home in [place]     Distant Location (provider location):  Off-site  Platform used for Video Visit: Kinsey    Joined the call at 10/31/2023, 1:57:02 pm.  Left the call at 10/31/2023, 2:17:38 pm.  You were on the call for 20 minutes 36 seconds .    Video - audio feed lag throughout visit     Saturnino Matthews is a 23 year old year old male with PMHx of seasonal allergies following with general GI for GERD and upper GI sx.       Today 10/31/23     Interval history  -EGD with LA grade A esophagitis.  Requested biopsies not taken.  Moderate size hiatal hernia.   Referral to thoracic surgery per endoscopist.  Normal stomach. No biopsies.  Normal duodenum.  Biopsied - peptic duodenitis without celiac disease.  Recommend omeprazole 20 mg indefinitely.   -Timed XR esophagram with tablet normal  -Manometry completed, pending results  -Meeting with thoracic surgery 11/1 for consideration of hiatal hernia repair  -- Might require 96-hour wireless capsule monitoring.  Await thoracic surgery recommendations.  Otherwise we will try to fit him in for 24-hour monitoring with endoscopy nursing.     1.  He is currently off PPIs (about 1--2 weeks) and shares symptoms are manageable but is having daily reflux and regur. Having having intermittent esophageal dysphagia of both solids and liquids.  This is rare and occurs intermittently. Will need to have another swallow in order to clear it. No issues with choking or requiring Heimlich maneuver. He underwent manometry yesterday awaiting results. Await pH testing, can potentially be fit into schedule tomorrow  "with endo if needed. We discussed that esophageal biopsies were not taken - etiology of inflammation unclear.     2. His largest issue remains breathing and substernal chest pain. Throughout the day he will need to take a few deep breathes in order to feel he is getting a good breath in. In addition to this, he also gets random upper abd \"achiness\" and pains. He has not got PFTs yet but will reach out to primary for this and scheduling his allergies remain an issue despite daily zyrtec use and he will be seeing an allergist soon. Previously he felt the ability to take a deep breath improve with PRN use of rescue inhaler (borrowed from others).     3. On omeprazole daily x 2 mo, he reports reflux was almost completely gone but felt the breathing issue and pain remained (see point 2)  He will be seeing thoracic surgery tomorrow to discuss potential hiatal hernia repair.     4. Stooling is much improved for him - he has started eating daily salads with chicken and introducing dietary fiber. To this end, his stools are now much better formed and manageable. Will have 1-2 BM a day (on average x2) formed with good consistency. BM in AM and PM. No blood or black stools. Weight and appetite are stable.     Does not have nickel allergy, taht he knows of   Denies F/C/N/V. Other ROS per HPI  He does not have any other concerns.     ----HPI 7/13/23 ---    Upper GI Sx   Shares a long standing hx of upper GI sx spanning to past 6-7 years.   Reports sx of phlegm sensation in throat, chest tightness, and heartburn.    Been on PPI off and on for 6-7 years for tx of these sx which he feels has helped overall. Most recently in past 6 mo he restarted omperazole 40 mg daily (which he takes 30-60 min before breakfast) . He self titrated to 20 mg daily in last few days and he feels all 3 listed sx are worsening. Feels pepcid has not helped. The worst sensation to him is the daily phlegm.     1) phlegm - shares he has a daily sensation of " "phlegm caught in his upper throat. Has to constantly clear his throat. If he runs dry, sensation worsen so he tries to keep hydrated. But sometimes drinking water does not help it go away either. Shares he took mucinex which has resolved this sensation.   He recently developed seasonal allergies and is taking OTC zyrtec (started earlier this year) when sx flare up. Triggers: GF dog, flowers.   Listed as allergic to PCN (childhood - developed fever and full body rash), and zithromax (rash, fever)  Shares his brothers also developed adult onset seasonal allergies as well.     2) anterior chest wall tightness -   Mainly to sternal area but radiates bidirectionally to R/L side when present.   Intermittent sensation, feels it is worsening though. Experiences an episode 2-3x a week, can last a few hours when present.   When present, he feels like he needs to breathe a few times in order to get to \"full lung capacity.\"  Shares he watches what he eats which helps. Tries to stay away from spicy foods (he love spicy foods), and watches the volume of food he he eats.   He does not have asthma but his older brother does have asthma. He has used friend's rescue inhaler and feels that it has helped with this tightness sensation when present.   He does have PFT ordered by PCP, not done yet    3) Dysphagia - in past year, intermittent and stable   Reports intermittent sensation for dysphagia for solids (not liquids), food gets stuck in throat and he needs to swallow a few times in order for food to go down.   Spanning into childhood, reports he tends to over-masticate. Shares that if he took too large a bite of food, he would have to chew thoroughly and then swallow the bolus in steps. When I inquired what happens if he tries to swallow the entire bolus at once, tells me he is not sure and has not tried.   Tells me he tends to be the last one to leave the dinner table eating.   Never had issues with food impactions or had to have " heimlich performed on him. Never ended up on ED for impaction. No hx of aspiration PNA.      4) Heart burn  - described as burning going up throat 1x / week with regurgitation of sour material into mouth 1-2x/wk.   Can be triggered by certain foods, at one time journaling his experience, but not able to report consistent triggers.   ?hot dogs (ate 3 in one setting) and had burning sensation.   Will take PRN tums (but infrequent) - not sure if it helps all the time     5) abd pain- started 2 year ago, noticed 1-2x a month  Crampy pain to lower abdominal region and hypogastric region. Pain tends to heralds the need to have a BM, resulting in a looser than normal BM (describes BSC Type 6-7). On the days he has this pain, BM are more frequent too. Pain goes away with BM.   Never had blood or melena. Never had mucus in BM. No rectal pain with defecation.   Outside of these episodes, he does not report abd pain in general.     Bowel pattern (all his life)   Usually 1-2 BM a day, not coupled. Reports good evacuation, typically. BSC type 6 on average, soft and easy to pass. No straining. On toilet for 5 min to have a BM.     Weight is stable  Appetite is good    Denies fevers, chills, weight loss, nausea, emesis, odynophagia, dysphonia/hoarseness, chronic cough, neck pain/swelling/mass,  abdominal pain, bloating/fullness, post prandial bloating.     Ibuprofen - takes 1-2 tabs for headaches; about 1-2x every few months    APAP - none  No use of OTC herbal supplements/weight loss products.      Rare - etoh use  Never smoker  occ edibles, otherwise no drugs     FHx   Father - dx with stage 4 colon cancer (aged 40)   Brother (31) - allergies, had a CSP that was negative. No polyps taken   Brother (29) - allergies     No other family history or GI related malignancy (esophageal, gastric, pancreatic, liver or colon) or family history of IBD/celiac disease.     Social Hx  Works as certified ophthalmic, eye clinic     PROBLEM  LIST  Patient Active Problem List    Diagnosis Date Noted    Gastroesophageal reflux disease, unspecified whether esophagitis present 02/05/2021     Priority: Medium    FH: colon cancer in relative <50 years old 02/05/2021     Priority: Medium    Palpitations 09/29/2016     Priority: Medium     PERTINENT PAST MEDICAL HISTORY:  No past medical history on file.    PREVIOUS SURGERIES:  Past Surgical History:   Procedure Laterality Date    ENT SURGERY  Adenoids    ESOPHAGOSCOPY, GASTROSCOPY, DUODENOSCOPY (EGD), COMBINED N/A 8/23/2023    Procedure: ESOPHAGOGASTRODUODEOSCOPY WITH BIOPSY;  Surgeon: Naomi Hamilton MD;  Location: [facility] OR    ORTHOPEDIC SURGERY  12/2017    [place] Tooth Extraction Bilateral Lower and Upper.       ALLERGIES:     Allergies   Allergen Reactions    Penicillins     Zithromax [Azithromycin]        PERTINENT MEDICATIONS:    Current Outpatient Medications:     cetirizine (ZYRTEC) 10 MG tablet, , Disp: , Rfl:     omeprazole 20 MG tablet, , Disp: , Rfl:     SOCIAL HISTORY:  Social History     Socioeconomic History    Marital status: Single     Spouse name: Not on file    Number of children: Not on file    Years of education: Not on file    Highest education level: Not on file   Occupational History    Not on file   Tobacco Use    Smoking status: Never    Smokeless tobacco: Never    Tobacco comments:     No exposure.   Vaping Use    Vaping Use: Never used   Substance and Sexual Activity    Alcohol use: Yes     Comment: Very occasional.    Drug use: Never    Sexual activity: Yes     Partners: Female     Birth control/protection: Condom   Other Topics Concern    Parent/sibling w/ CABG, MI or angioplasty before 65F 55M? No   Social History Narrative    Not on file     Social Determinants of Health     Financial Resource Strain: Not on file   Food Insecurity: Not on file   Transportation Needs: Not on file   Physical Activity: Not on file   Stress: Not on file   Social Connections: Not on file    Interpersonal Safety: Not on file   Housing Stability: Not on file     FAMILY HISTORY:  Family History   Problem Relation Age of Onset    Hypertension Mother     Colon Cancer Father 48    Hypertension Maternal Grandmother     Diabetes Maternal Grandmother         Type 2 - Very prevalent on both sides of family    Aneurysm Maternal Grandfather         Brain    Melanoma Paternal Grandfather     Prostate Cancer Paternal Grandfather     Other - See Comments Other         A-fib.    Diabetes Other         My Maternal Uncle has Type 1 diabetes    Asthma Brother         Mild and developed in pre-teens.     Past/family/social history reviewed and no changes     PHYSICAL EXAMINATION:  Vitals reviewed: There were no vitals taken for this visit.  Wt:   Wt Readings from Last 2 Encounters:   10/25/23 72.1 kg (159 lb)   08/23/23 72.1 kg (159 lb)     Video physical exam  General: Patient appears well in no acute distress.   Skin: No visualized rash or lesions on visualized skin  Eyes: EOMI, no erythema, sclera icterus or discharge noted  Resp: Appears to be breathing comfortably without accessory muscle usage, speaking in full sentences, no cough  MSK: Appears to have normal range of motion based on visualized movements  Neurologic: No apparent tremors, facial movements symmetric  Psych: affect normal, alert and oriented    PERTINENT STUDIES:    Office Visit on 06/30/2023   Component Date Value Ref Range Status    HIV Antigen Antibody Combo 06/30/2023 Nonreactive  Nonreactive Final    Hepatitis C Antibody 06/30/2023 Nonreactive  Nonreactive Final    Sodium 06/30/2023 137  136 - 145 mmol/L Final    Potassium 06/30/2023 4.1  3.4 - 5.3 mmol/L Final    Chloride 06/30/2023 98  98 - 107 mmol/L Final    Carbon Dioxide (CO2) 06/30/2023 26  22 - 29 mmol/L Final    Anion Gap 06/30/2023 13  7 - 15 mmol/L Final    Urea Nitrogen 06/30/2023 17.6  6.0 - 20.0 mg/dL Final    Creatinine 06/30/2023 0.82  0.67 - 1.17 mg/dL Final    Calcium  06/30/2023 9.9  8.6 - 10.0 mg/dL Final    Glucose 06/30/2023 91  70 - 99 mg/dL Final    Alkaline Phosphatase 06/30/2023 53  40 - 129 U/L Final    AST 06/30/2023 64 (H)  0 - 45 U/L Final    ALT 06/30/2023 48  0 - 70 U/L Final    Protein Total 06/30/2023 7.8  6.4 - 8.3 g/dL Final    Albumin 06/30/2023 4.7  3.5 - 5.2 g/dL Final    Bilirubin Total 06/30/2023 0.5  <=1.2 mg/dL Final    GFR Estimate 06/30/2023 >90  >60 mL/min/1.73m2 Final    WBC Count 06/30/2023 5.8  4.0 - 11.0 10e3/uL Final    RBC Count 06/30/2023 5.44  4.40 - 5.90 10e6/uL Final    Hemoglobin 06/30/2023 16.3  13.3 - 17.7 g/dL Final    Hematocrit 06/30/2023 47.0  40.0 - 53.0 % Final    MCV 06/30/2023 86  78 - 100 fL Final    MCH 06/30/2023 30.0  26.5 - 33.0 pg Final    MCHC 06/30/2023 34.7  31.5 - 36.5 g/dL Final    RDW 06/30/2023 12.3  10.0 - 15.0 % Final    Platelet Count 06/30/2023 228  150 - 450 10e3/uL Final    % Neutrophils 06/30/2023 61  % Final    % Lymphocytes 06/30/2023 29  % Final    % Monocytes 06/30/2023 7  % Final    % Eosinophils 06/30/2023 2  % Final    % Basophils 06/30/2023 1  % Final    % Immature Granulocytes 06/30/2023 0  % Final    Absolute Neutrophils 06/30/2023 3.6  1.6 - 8.3 10e3/uL Final    Absolute Lymphocytes 06/30/2023 1.7  0.8 - 5.3 10e3/uL Final    Absolute Monocytes 06/30/2023 0.4  0.0 - 1.3 10e3/uL Final    Absolute Eosinophils 06/30/2023 0.1  0.0 - 0.7 10e3/uL Final    Absolute Basophils 06/30/2023 0.0  0.0 - 0.2 10e3/uL Final    Absolute Immature Granulocytes 06/30/2023 0.0  <=0.4 10e3/uL Final        Lab Results   Component Value Date    WBC 5.8 06/30/2023    WBC 6.2 08/05/2016    HGB 16.3 06/30/2023    HGB 16.5 (H) 08/05/2016     06/30/2023     08/05/2016    ALT 46 07/13/2023    ALT 48 06/30/2023    ALT 27 08/05/2016    AST 28 07/13/2023    AST 64 (H) 06/30/2023    AST 20 08/05/2016     06/30/2023     08/05/2016    BUN 17.6 06/30/2023    BUN 14 08/05/2016    CO2 26 06/30/2023    CO2 29  08/05/2016    TSH 2.58 08/05/2016        Helicobacter pylori Antigen Stool Negative Negative     Comment: Negative for Helicobacter pylori antigen by enzyme immunoassay. A negative result indicates the absence of H. pylori antigen or that the level of antigen is below the level of detection.       Liver Function Studies -   Recent Labs   Lab Test 06/30/23  0858   PROTTOTAL 7.8   ALBUMIN 4.7   BILITOTAL 0.5   ALKPHOS 53   AST 64*   ALT 48        PREVIOUS ENDOSCOPY    No results found for this or any previous visit.    Upper GI Endoscopy 08/23/2023  9:37 AM [facility]   Clinics and Surgery Center   [address] [place], [place] [address]., [place] [address] [phone]     Endoscopy Department   _______________________________________________________________________________   Patient Name: Saturnino Matthews        Procedure Date: 8/23/2023 9:37 AM   MRN: [id number]                       Account Number: [id number]   YOB: 2000              Admit Type: Outpatient   Age: 23                               Room: [facility] PROCEDURE ROOM 02   Gender: Male                          Note Status: Finalized   Attending MD: DEVI SELF MD,   Total Sedation Time:   _______________________________________________________________________________       Procedure:             Upper GI endoscopy   Indications:           Dysphagia, Heartburn, Diarrhea   Providers:             DEVI SELF MD, Rosalva Galvin RN,                          Rina Kirby CRNA   Referring MD:          FORD FISHER   Medicines:             Monitored Anesthesia Care   Complications:         No immediate complications. Estimated blood loss:                          Minimal.   _______________________________________________________________________________   Procedure:             Pre-Anesthesia Assessment:                          - Prior to the procedure, a History and Physical was                          performed, and patient medications, allergies  and                          sensitivities were reviewed. The patient's tolerance                          of previous anesthesia was reviewed.                          After obtaining informed consent, the endoscope was                          passed under direct vision. Throughout the procedure,                          the patient's blood pressure, pulse, and oxygen                          saturations were monitored continuously. The Endoscope                          was introduced through the mouth, and advanced to the                          second part of duodenum. The upper GI endoscopy was                          accomplished without difficulty. The patient tolerated                          the procedure well.                                                                                     Findings:        LA Grade A (one or more mucosal breaks less than 5 mm, not extending        between tops of 2 mucosal folds) esophagitis with no bleeding was found.        The exam of the esophagus was otherwise normal.        A medium-sized hiatal hernia was found. The proximal extent of the        gastric folds (end of tubular esophagus) was 37 cm from the incisors.        The hiatal narrowing was 40 cm from the incisors. The Z-line was 37 cm        from the incisors.        The exam of the stomach was otherwise normal.        The examined duodenum was normal. Biopsies for histology were taken with        a cold forceps for evaluation of celiac disease. Estimated blood loss        was minimal.                                                                                     Impression:            - LA Grade A reflux esophagitis with no bleeding.                          - Medium-sized hiatal hernia.                          - Normal examined duodenum. Biopsied for Celiac given                          clinical history.                          - The likely cause of swallowing difficulty is 1) acid                           reflux (GERD) or 2) 3cm hiatal hernia.   Recommendation:        - Discharge patient to home (with escort).                          - Use Prilosec (omeprazole) 20 mg PO daily                          indefinitely.                          - If your trouble swallowing continues after 2 months                          of strict adherence to your reflux medicine, follow up                          in clinic to discuss procedure options for your hiatal                          hernia.                          - Await pathology results.                          - The findings and recommendations were discussed with                          the patient.                                                                  Final Diagnosis   DUODENUM, BIOPSY:  - Duodenal mucosa with focal gastric foveolar metaplasia consistent with peptic duodenitis  - No evidence of celiac disease          ASSESSMENT/PLAN:  Saturnino Matthews is a 23 year old year old male with PMHx significant for seasonal alleriges and ?atopic history (likely asthma vs RAD) who is following with Gen GI for partially responsive GERD. He describes esophageal dysphagia of both solid and liquids (rare), upper abdomen pain/achy sensation and inability to take a full breath.   He had a recent EGD with findings of LA Grade Esophagitis, moderate sized hiatal hernia, and normal duodenum that was BX showing peptic duodenitis w/o celiac disease.   He is to see thoracic surgery tomorrow to consider surgical correction of hiatal hernia and presents today for routine follow-up.      #EGD findings  #LA Grade A Esophagitis, no biopsies   #moderate sized hiatal hernia  #peptic duodenitis NEG for celiac   DDX - EoE vs under-treated/refractory GERD, motility disorder vs other    -manometry yesterday, awaiting guidance from thoracic about duration of pH testing (24 vs 96h). He denies known nickel allergy.  -while he is off PPI, asked leave h.pylori stool antigen  (been off PPI ~1-2 weeks)  -thoracic surgery appt tomorrow for hiatal hernia   -now finished 2 mo omperazole 20 mg BID, paused for continued work up per above     Future consideration  If proceeding with 96h wireless pH monitoring, consider proximal and distal eso biopsies at that time   Follow up with eso team     #esophageal dysphagia, rare   In setting of LA Grade A esophagitis and moderate sized hiatal hernia. XR esophagram with tablet WNL   DDX -  EoE/GERD, motility disorder, eso spasm vs other, DGBI, vs others  -await manometry findings  -Finished omeprazole 20 mg BID x 2 mo, interrupted for a few weeks for continued testing    Future consideration  1. If proceeding with 96h wireless pH monitoring, consider proximal and distal eso biopsies at that time   2. Follow up with eso team     #inability to take a deep breath  #upper abd pain    In setting of moderate hiatal hernia and subjective improvement with PRN albuterol use  DDX - hiatal hernia crowding chest cavity, RAD vs asthma, esophageal motility disorder  -to see thoracic tomorrow  -encouraged him to make an appt for PFT testing (previously discussed w/ primary)  -to see allergist for consultation   -rec'd sodium alginate with either reflux gourmet or gaviscon Extra Strength  -recommended diaphragmatic breathing exercises     Future Consideration  1. Extend PPI course +/- increase dose   2. Addition of H2RA, scheduled vs PRN     #BSC type 6, h/o   #infrequent abd pain with resultant change in BM consistency and frequency   Historically, baseline 1-2 BM of BSC type 6 stools all his life; worsened in consistency and frequency with onset of lower abdominal pain (1/2x a month) suggestive of IBS-D pattern. With increase of dietary fibers, now reporting daily formed stools. Weight and appetite are baseline and maintained. Celiac disease ruled out with recent duodenal biopsy. Stool testing giardia/cryptosporidium and Ova and Parasite were NEG  DDX includes  malabsorption (SIBO vs other), inflammation (microscopic), hyperthyroidism (less likely, history of WNL TSHs), inflammation vs other    -With drastic improvement in stool consistency, we agreed to hold further evaluation at this time  -Continue intake of dietary fiber to total 30g/d approx from all sources. OK to supplement with powdered citrucel or metamucil as needed     Future Consideration  1. If sx were to recur, would initiate diarrhea work up as indicated (infectious, malabsorption, inflammatory, hyperthyroidism etc)  2. RD consult for low FODMAP diet and retrial   3. Trial of IB reese for sx flares   4. Diagnostic Colonoscopy with random colon biopsies     #elevated AST  Normalized on repeat testing. No APAP. Infrequent ETOH use. Consistent with transient elevation in liver enzyme, med induced. Less likely hepatitis as levels normalized.     Future consideration  1.  If elevates in future, consider right upper quadrant ultrasound +/- viral hepatitis panels, etc    No orders of the defined types were placed in this encounter.    Colorectal cancer screening: aged 30, unless otherwise indicated     RTC 3 mo, or otherwise clinically indicated   Can RTC to see our GI team at [facility] or GI at [facility] (which is closer to him)     Thank you for this consultation.  It was a pleasure to participate in the care of this patient; please contact me with any further questions.     Chely Blue PA-C    Follow up: As planned above. Today, I personally spent 20 minutes in direct face to face time with the patient, of which greater than 50% of the time was spent in patient education and counseling as described above. Approximately 10  minutes were spent on indirect care associated with the patient's consultation including but not limited to review of: patient medical records to date, clinic visits, hospital records, lab results, imaging studies, procedural documentation, and coordinating care with other providers. The  findings from this review are summarized in the above note. All of the above accounted for a cumulative time of 30 minutes and was performed on the date of service.       Again, thank you for allowing me to participate in the care of your patient.      Sincerely,    Chely Blue PA-C

## 2023-10-31 NOTE — PROGRESS NOTES
GI CLINIC VISIT    Virtual Visit Details    Type of service:  Video Visit     Originating Location (pt. Location): Home in [place]     Distant Location (provider location):  Off-site  Platform used for Video Visit: Kinsey    Joined the call at 10/31/2023, 1:57:02 pm.  Left the call at 10/31/2023, 2:17:38 pm.  You were on the call for 20 minutes 36 seconds .    Video - audio feed lag throughout visit     Saturnino Matthews is a 23 year old year old male with PMHx of seasonal allergies following with general GI for GERD and upper GI sx.       Today 10/31/23     Interval history  -EGD with LA grade A esophagitis.  Requested biopsies not taken.  Moderate size hiatal hernia.   Referral to thoracic surgery per endoscopist.  Normal stomach. No biopsies.  Normal duodenum.  Biopsied - peptic duodenitis without celiac disease.  Recommend omeprazole 20 mg indefinitely.   -Timed XR esophagram with tablet normal  -Manometry completed, pending results  -Meeting with thoracic surgery 11/1 for consideration of hiatal hernia repair  -- Might require 96-hour wireless capsule monitoring.  Await thoracic surgery recommendations.  Otherwise we will try to fit him in for 24-hour monitoring with endoscopy nursing.     1.  He is currently off PPIs (about 1--2 weeks) and shares symptoms are manageable but is having daily reflux and regur. Having having intermittent esophageal dysphagia of both solids and liquids.  This is rare and occurs intermittently. Will need to have another swallow in order to clear it. No issues with choking or requiring Heimlich maneuver. He underwent manometry yesterday awaiting results. Await pH testing, can potentially be fit into schedule tomorrow with endo if needed. We discussed that esophageal biopsies were not taken - etiology of inflammation unclear.     2. His largest issue remains breathing and substernal chest pain. Throughout the day he will need to take a few deep breathes in order to feel he is getting a  "good breath in. In addition to this, he also gets random upper abd \"achiness\" and pains. He has not got PFTs yet but will reach out to primary for this and scheduling his allergies remain an issue despite daily zyrtec use and he will be seeing an allergist soon. Previously he felt the ability to take a deep breath improve with PRN use of rescue inhaler (borrowed from others).     3. On omeprazole daily x 2 mo, he reports reflux was almost completely gone but felt the breathing issue and pain remained (see point 2)  He will be seeing thoracic surgery tomorrow to discuss potential hiatal hernia repair.     4. Stooling is much improved for him - he has started eating daily salads with chicken and introducing dietary fiber. To this end, his stools are now much better formed and manageable. Will have 1-2 BM a day (on average x2) formed with good consistency. BM in AM and PM. No blood or black stools. Weight and appetite are stable.     Does not have nickel allergy, taht he knows of   Denies F/C/N/V. Other ROS per HPI  He does not have any other concerns.     ----HPI 7/13/23 ---    Upper GI Sx   Shares a long standing hx of upper GI sx spanning to past 6-7 years.   Reports sx of phlegm sensation in throat, chest tightness, and heartburn.    Been on PPI off and on for 6-7 years for tx of these sx which he feels has helped overall. Most recently in past 6 mo he restarted omperazole 40 mg daily (which he takes 30-60 min before breakfast) . He self titrated to 20 mg daily in last few days and he feels all 3 listed sx are worsening. Feels pepcid has not helped. The worst sensation to him is the daily phlegm.     1) phlegm - shares he has a daily sensation of phlegm caught in his upper throat. Has to constantly clear his throat. If he runs dry, sensation worsen so he tries to keep hydrated. But sometimes drinking water does not help it go away either. Shares he took mucinex which has resolved this sensation.   He recently " "developed seasonal allergies and is taking OTC zyrtec (started earlier this year) when sx flare up. Triggers: GF dog, flowers.   Listed as allergic to PCN (childhood - developed fever and full body rash), and zithromax (rash, fever)  Shares his brothers also developed adult onset seasonal allergies as well.     2) anterior chest wall tightness -   Mainly to sternal area but radiates bidirectionally to R/L side when present.   Intermittent sensation, feels it is worsening though. Experiences an episode 2-3x a week, can last a few hours when present.   When present, he feels like he needs to breathe a few times in order to get to \"full lung capacity.\"  Shares he watches what he eats which helps. Tries to stay away from spicy foods (he love spicy foods), and watches the volume of food he he eats.   He does not have asthma but his older brother does have asthma. He has used friend's rescue inhaler and feels that it has helped with this tightness sensation when present.   He does have PFT ordered by PCP, not done yet    3) Dysphagia - in past year, intermittent and stable   Reports intermittent sensation for dysphagia for solids (not liquids), food gets stuck in throat and he needs to swallow a few times in order for food to go down.   Spanning into childhood, reports he tends to over-masticate. Shares that if he took too large a bite of food, he would have to chew thoroughly and then swallow the bolus in steps. When I inquired what happens if he tries to swallow the entire bolus at once, tells me he is not sure and has not tried.   Tells me he tends to be the last one to leave the dinner table eating.   Never had issues with food impactions or had to have heimlich performed on him. Never ended up on ED for impaction. No hx of aspiration PNA.      4) Heart burn  - described as burning going up throat 1x / week with regurgitation of sour material into mouth 1-2x/wk.   Can be triggered by certain foods, at one time " journaling his experience, but not able to report consistent triggers.   ?hot dogs (ate 3 in one setting) and had burning sensation.   Will take PRN tums (but infrequent) - not sure if it helps all the time     5) abd pain- started 2 year ago, noticed 1-2x a month  Crampy pain to lower abdominal region and hypogastric region. Pain tends to heralds the need to have a BM, resulting in a looser than normal BM (describes BSC Type 6-7). On the days he has this pain, BM are more frequent too. Pain goes away with BM.   Never had blood or melena. Never had mucus in BM. No rectal pain with defecation.   Outside of these episodes, he does not report abd pain in general.     Bowel pattern (all his life)   Usually 1-2 BM a day, not coupled. Reports good evacuation, typically. BSC type 6 on average, soft and easy to pass. No straining. On toilet for 5 min to have a BM.     Weight is stable  Appetite is good    Denies fevers, chills, weight loss, nausea, emesis, odynophagia, dysphonia/hoarseness, chronic cough, neck pain/swelling/mass,  abdominal pain, bloating/fullness, post prandial bloating.     Ibuprofen - takes 1-2 tabs for headaches; about 1-2x every few months    APAP - none  No use of OTC herbal supplements/weight loss products.      Rare - etoh use  Never smoker  occ edibles, otherwise no drugs     FHx   Father - dx with stage 4 colon cancer (aged 40)   Brother (31) - allergies, had a CSP that was negative. No polyps taken   Brother (29) - allergies     No other family history or GI related malignancy (esophageal, gastric, pancreatic, liver or colon) or family history of IBD/celiac disease.     Social Hx  Works as certified ophthalmic, eye clinic     PROBLEM LIST  Patient Active Problem List    Diagnosis Date Noted    Gastroesophageal reflux disease, unspecified whether esophagitis present 02/05/2021     Priority: Medium    FH: colon cancer in relative <50 years old 02/05/2021     Priority: Medium    Palpitations  09/29/2016     Priority: Medium     PERTINENT PAST MEDICAL HISTORY:  No past medical history on file.    PREVIOUS SURGERIES:  Past Surgical History:   Procedure Laterality Date    ENT SURGERY  Adenoids    ESOPHAGOSCOPY, GASTROSCOPY, DUODENOSCOPY (EGD), COMBINED N/A 8/23/2023    Procedure: ESOPHAGOGASTRODUODEOSCOPY WITH BIOPSY;  Surgeon: Naomi Hamilton MD;  Location: [facility] OR    ORTHOPEDIC SURGERY  12/2017    [place] Tooth Extraction Bilateral Lower and Upper.       ALLERGIES:     Allergies   Allergen Reactions    Penicillins     Zithromax [Azithromycin]        PERTINENT MEDICATIONS:    Current Outpatient Medications:     cetirizine (ZYRTEC) 10 MG tablet, , Disp: , Rfl:     omeprazole 20 MG tablet, , Disp: , Rfl:     SOCIAL HISTORY:  Social History     Socioeconomic History    Marital status: Single     Spouse name: Not on file    Number of children: Not on file    Years of education: Not on file    Highest education level: Not on file   Occupational History    Not on file   Tobacco Use    Smoking status: Never    Smokeless tobacco: Never    Tobacco comments:     No exposure.   Vaping Use    Vaping Use: Never used   Substance and Sexual Activity    Alcohol use: Yes     Comment: Very occasional.    Drug use: Never    Sexual activity: Yes     Partners: Female     Birth control/protection: Condom   Other Topics Concern    Parent/sibling w/ CABG, MI or angioplasty before 65F 55M? No   Social History Narrative    Not on file     Social Determinants of Health     Financial Resource Strain: Not on file   Food Insecurity: Not on file   Transportation Needs: Not on file   Physical Activity: Not on file   Stress: Not on file   Social Connections: Not on file   Interpersonal Safety: Not on file   Housing Stability: Not on file     FAMILY HISTORY:  Family History   Problem Relation Age of Onset    Hypertension Mother     Colon Cancer Father 48    Hypertension Maternal Grandmother     Diabetes Maternal Grandmother          Type 2 - Very prevalent on both sides of family    Aneurysm Maternal Grandfather         Brain    Melanoma Paternal Grandfather     Prostate Cancer Paternal Grandfather     Other - See Comments Other         A-fib.    Diabetes Other         My Maternal Uncle has Type 1 diabetes    Asthma Brother         Mild and developed in pre-teens.     Past/family/social history reviewed and no changes     PHYSICAL EXAMINATION:  Vitals reviewed: There were no vitals taken for this visit.  Wt:   Wt Readings from Last 2 Encounters:   10/25/23 72.1 kg (159 lb)   08/23/23 72.1 kg (159 lb)     Video physical exam  General: Patient appears well in no acute distress.   Skin: No visualized rash or lesions on visualized skin  Eyes: EOMI, no erythema, sclera icterus or discharge noted  Resp: Appears to be breathing comfortably without accessory muscle usage, speaking in full sentences, no cough  MSK: Appears to have normal range of motion based on visualized movements  Neurologic: No apparent tremors, facial movements symmetric  Psych: affect normal, alert and oriented    PERTINENT STUDIES:    Office Visit on 06/30/2023   Component Date Value Ref Range Status    HIV Antigen Antibody Combo 06/30/2023 Nonreactive  Nonreactive Final    Hepatitis C Antibody 06/30/2023 Nonreactive  Nonreactive Final    Sodium 06/30/2023 137  136 - 145 mmol/L Final    Potassium 06/30/2023 4.1  3.4 - 5.3 mmol/L Final    Chloride 06/30/2023 98  98 - 107 mmol/L Final    Carbon Dioxide (CO2) 06/30/2023 26  22 - 29 mmol/L Final    Anion Gap 06/30/2023 13  7 - 15 mmol/L Final    Urea Nitrogen 06/30/2023 17.6  6.0 - 20.0 mg/dL Final    Creatinine 06/30/2023 0.82  0.67 - 1.17 mg/dL Final    Calcium 06/30/2023 9.9  8.6 - 10.0 mg/dL Final    Glucose 06/30/2023 91  70 - 99 mg/dL Final    Alkaline Phosphatase 06/30/2023 53  40 - 129 U/L Final    AST 06/30/2023 64 (H)  0 - 45 U/L Final    ALT 06/30/2023 48  0 - 70 U/L Final    Protein Total 06/30/2023 7.8  6.4 - 8.3 g/dL  Final    Albumin 06/30/2023 4.7  3.5 - 5.2 g/dL Final    Bilirubin Total 06/30/2023 0.5  <=1.2 mg/dL Final    GFR Estimate 06/30/2023 >90  >60 mL/min/1.73m2 Final    WBC Count 06/30/2023 5.8  4.0 - 11.0 10e3/uL Final    RBC Count 06/30/2023 5.44  4.40 - 5.90 10e6/uL Final    Hemoglobin 06/30/2023 16.3  13.3 - 17.7 g/dL Final    Hematocrit 06/30/2023 47.0  40.0 - 53.0 % Final    MCV 06/30/2023 86  78 - 100 fL Final    MCH 06/30/2023 30.0  26.5 - 33.0 pg Final    MCHC 06/30/2023 34.7  31.5 - 36.5 g/dL Final    RDW 06/30/2023 12.3  10.0 - 15.0 % Final    Platelet Count 06/30/2023 228  150 - 450 10e3/uL Final    % Neutrophils 06/30/2023 61  % Final    % Lymphocytes 06/30/2023 29  % Final    % Monocytes 06/30/2023 7  % Final    % Eosinophils 06/30/2023 2  % Final    % Basophils 06/30/2023 1  % Final    % Immature Granulocytes 06/30/2023 0  % Final    Absolute Neutrophils 06/30/2023 3.6  1.6 - 8.3 10e3/uL Final    Absolute Lymphocytes 06/30/2023 1.7  0.8 - 5.3 10e3/uL Final    Absolute Monocytes 06/30/2023 0.4  0.0 - 1.3 10e3/uL Final    Absolute Eosinophils 06/30/2023 0.1  0.0 - 0.7 10e3/uL Final    Absolute Basophils 06/30/2023 0.0  0.0 - 0.2 10e3/uL Final    Absolute Immature Granulocytes 06/30/2023 0.0  <=0.4 10e3/uL Final        Lab Results   Component Value Date    WBC 5.8 06/30/2023    WBC 6.2 08/05/2016    HGB 16.3 06/30/2023    HGB 16.5 (H) 08/05/2016     06/30/2023     08/05/2016    ALT 46 07/13/2023    ALT 48 06/30/2023    ALT 27 08/05/2016    AST 28 07/13/2023    AST 64 (H) 06/30/2023    AST 20 08/05/2016     06/30/2023     08/05/2016    BUN 17.6 06/30/2023    BUN 14 08/05/2016    CO2 26 06/30/2023    CO2 29 08/05/2016    TSH 2.58 08/05/2016        Helicobacter pylori Antigen Stool Negative Negative     Comment: Negative for Helicobacter pylori antigen by enzyme immunoassay. A negative result indicates the absence of H. pylori antigen or that the level of antigen is below the level of  detection.       Liver Function Studies -   Recent Labs   Lab Test 06/30/23  0858   PROTTOTAL 7.8   ALBUMIN 4.7   BILITOTAL 0.5   ALKPHOS 53   AST 64*   ALT 48        PREVIOUS ENDOSCOPY    No results found for this or any previous visit.    Upper GI Endoscopy 08/23/2023  9:37 AM [facility]   [address] [place], [place] [address]., [place] [address] [phone]     Endoscopy Department   _______________________________________________________________________________   Patient Name: Saturnino Matthews        Procedure Date: 8/23/2023 9:37 AM   MRN: [id number]                       Account Number: [id number]   YOB: 2000              Admit Type: Outpatient   Age: 23                               Room: [facility] PROCEDURE ROOM 02   Gender: Male                          Note Status: Finalized   Attending MD: DEVI SELF MD,   Total Sedation Time:   _______________________________________________________________________________       Procedure:             Upper GI endoscopy   Indications:           Dysphagia, Heartburn, Diarrhea   Providers:             DEVI SELF MD, Rosalva Galvin RN,                          Rina Kirby CRNA   Referring MD:          FORD FISHER   Medicines:             Monitored Anesthesia Care   Complications:         No immediate complications. Estimated blood loss:                          Minimal.   _______________________________________________________________________________   Procedure:             Pre-Anesthesia Assessment:                          - Prior to the procedure, a History and Physical was                          performed, and patient medications, allergies and                          sensitivities were reviewed. The patient's tolerance                          of previous anesthesia was reviewed.                          After obtaining informed consent, the endoscope was                          passed under direct vision. Throughout  the procedure,                          the patient's blood pressure, pulse, and oxygen                          saturations were monitored continuously. The Endoscope                          was introduced through the mouth, and advanced to the                          second part of duodenum. The upper GI endoscopy was                          accomplished without difficulty. The patient tolerated                          the procedure well.                                                                                     Findings:        LA Grade A (one or more mucosal breaks less than 5 mm, not extending        between tops of 2 mucosal folds) esophagitis with no bleeding was found.        The exam of the esophagus was otherwise normal.        A medium-sized hiatal hernia was found. The proximal extent of the        gastric folds (end of tubular esophagus) was 37 cm from the incisors.        The hiatal narrowing was 40 cm from the incisors. The Z-line was 37 cm        from the incisors.        The exam of the stomach was otherwise normal.        The examined duodenum was normal. Biopsies for histology were taken with        a cold forceps for evaluation of celiac disease. Estimated blood loss        was minimal.                                                                                     Impression:            - LA Grade A reflux esophagitis with no bleeding.                          - Medium-sized hiatal hernia.                          - Normal examined duodenum. Biopsied for Celiac given                          clinical history.                          - The likely cause of swallowing difficulty is 1) acid                          reflux (GERD) or 2) 3cm hiatal hernia.   Recommendation:        - Discharge patient to home (with escort).                          - Use Prilosec (omeprazole) 20 mg PO daily                          indefinitely.                          - If your trouble swallowing  continues after 2 months                          of strict adherence to your reflux medicine, follow up                          in clinic to discuss procedure options for your hiatal                          hernia.                          - Await pathology results.                          - The findings and recommendations were discussed with                          the patient.                                                                  Final Diagnosis   DUODENUM, BIOPSY:  - Duodenal mucosa with focal gastric foveolar metaplasia consistent with peptic duodenitis  - No evidence of celiac disease          ASSESSMENT/PLAN:  Saturnino Matthews is a 23 year old year old male with PMHx significant for seasonal alleriges and ?atopic history (likely asthma vs RAD) who is following with Gen GI for partially responsive GERD. He describes esophageal dysphagia of both solid and liquids (rare), upper abdomen pain/achy sensation and inability to take a full breath.   He had a recent EGD with findings of LA Grade Esophagitis, moderate sized hiatal hernia, and normal duodenum that was BX showing peptic duodenitis w/o celiac disease.   He is to see thoracic surgery tomorrow to consider surgical correction of hiatal hernia and presents today for routine follow-up.      #EGD findings  #LA Grade A Esophagitis, no biopsies   #moderate sized hiatal hernia  #peptic duodenitis NEG for celiac   DDX - EoE vs under-treated/refractory GERD, motility disorder vs other    -manometry yesterday, awaiting guidance from thoracic about duration of pH testing (24 vs 96h). He denies known nickel allergy.  -while he is off PPI, asked leave h.pylori stool antigen (been off PPI ~1-2 weeks)  -thoracic surgery appt tomorrow for hiatal hernia   -now finished 2 mo omperazole 20 mg BID, paused for continued work up per above     Future consideration  If proceeding with 96h wireless pH monitoring, consider proximal and distal eso biopsies at that  time   Follow up with eso team     #esophageal dysphagia, rare   In setting of LA Grade A esophagitis and moderate sized hiatal hernia. XR esophagram with tablet WNL   DDX -  EoE/GERD, motility disorder, eso spasm vs other, DGBI, vs others  -await manometry findings  -Finished omeprazole 20 mg BID x 2 mo, interrupted for a few weeks for continued testing    Future consideration  1. If proceeding with 96h wireless pH monitoring, consider proximal and distal eso biopsies at that time   2. Follow up with eso team     #inability to take a deep breath  #upper abd pain    In setting of moderate hiatal hernia and subjective improvement with PRN albuterol use  DDX - hiatal hernia crowding chest cavity, RAD vs asthma, esophageal motility disorder  -to see thoracic tomorrow  -encouraged him to make an appt for PFT testing (previously discussed w/ primary)  -to see allergist for consultation   -rec'd sodium alginate with either reflux gourmet or gaviscon Extra Strength  -recommended diaphragmatic breathing exercises     Future Consideration  1. Extend PPI course +/- increase dose   2. Addition of H2RA, scheduled vs PRN     #BSC type 6, h/o   #infrequent abd pain with resultant change in BM consistency and frequency   Historically, baseline 1-2 BM of BSC type 6 stools all his life; worsened in consistency and frequency with onset of lower abdominal pain (1/2x a month) suggestive of IBS-D pattern. With increase of dietary fibers, now reporting daily formed stools. Weight and appetite are baseline and maintained. Celiac disease ruled out with recent duodenal biopsy. Stool testing giardia/cryptosporidium and Ova and Parasite were NEG  DDX includes malabsorption (SIBO vs other), inflammation (microscopic), hyperthyroidism (less likely, history of WNL TSHs), inflammation vs other    -With drastic improvement in stool consistency, we agreed to hold further evaluation at this time  -Continue intake of dietary fiber to total 30g/d  approx from all sources. OK to supplement with powdered citrucel or metamucil as needed     Future Consideration  1. If sx were to recur, would initiate diarrhea work up as indicated (infectious, malabsorption, inflammatory, hyperthyroidism etc)  2. RD consult for low FODMAP diet and retrial   3. Trial of IB reese for sx flares   4. Diagnostic Colonoscopy with random colon biopsies     #elevated AST  Normalized on repeat testing. No APAP. Infrequent ETOH use. Consistent with transient elevation in liver enzyme, med induced. Less likely hepatitis as levels normalized.     Future consideration  1.  If elevates in future, consider right upper quadrant ultrasound +/- viral hepatitis panels, etc    No orders of the defined types were placed in this encounter.    Colorectal cancer screening: aged 30, unless otherwise indicated     RTC 3 mo, or otherwise clinically indicated   [place] [facility] [place] [facility] (which is closer to him)     Thank you for this consultation.  It was a pleasure to participate in the care of this patient; please contact me with any further questions.     Chely Blue PA-C    Follow up: As planned above. Today, I personally spent 20 minutes in direct face to face time with the patient, of which greater than 50% of the time was spent in patient education and counseling as described above. Approximately 10  minutes were spent on indirect care associated with the patient's consultation including but not limited to review of: patient medical records to date, clinic visits, hospital records, lab results, imaging studies, procedural documentation, and coordinating care with other providers. The findings from this review are summarized in the above note. All of the above accounted for a cumulative time of 30 minutes and was performed on the date of service.

## 2023-10-31 NOTE — NURSING NOTE
Is the patient currently in the state of [place]? YES    Visit mode:VIDEO    If the visit is dropped, the patient can be reconnected by: VIDEO VISIT: Text to cell phone:   Telephone Information:   Mobile [phone]       Will anyone else be joining the visit? NO  (If patient encounters technical issues they should call [phone]    How would you like to obtain your AVS? MyChart    Are changes needed to the allergy or medication list? No    Reason for visit: RECHECK    Lj GUERRERO

## 2023-10-31 NOTE — PATIENT INSTRUCTIONS
"It was a pleasure taking care of you today.  I've included a brief summary of our discussion and care plan from today's visit below.  Please review this information with your primary care provider.  _______________________________________________________________________    My recommendations are summarized as follows:    While you are off the omeprazole, let's get an h.pylori stool test. Orders are active in system, call to schedule with any  Infor lab.   Agree with meeting with thoracic about findings and discussion on potential surgery, longevity and management options   --surgery is an individualized decision and gathering as much info as you can (just a you are doing) is what I would recommend so that you can make an informed decision about next best steps.   Will await thoracic's recommendation regarding additional testing   Will get back to you on specifics after speaking with my esophageal team   Can try gaviscon Extra Strength OR reflux gourmet (sodium alginate) to see if that helps. Acts as physical barrier that floats to top of stomach to stem acid and stomach contents from going up esophagus.   If stooling changes, you lose weight, have bloody stools etc, or other \"alarm symptoms,\" let me know. We will proceed with further lower GI testing     Please call RN Care Coordinator Chasity at [phone] with any questions or concerns.    Return to GI Clinic in 3 months to review your progress.    _______________________________________________________________________    How do I schedule labs, imaging studies, or procedures that were ordered in clinic today?      Labs: To schedule lab appointment at the [facility], use my chart or call [phone]. If you have a [facility] lab closer to home where you are regularly seen you can give them a call.      Procedures: If a colonoscopy, upper endoscopy, breath test, esophageal manometry, or pH impedence was ordered today, our endoscopy team will " call you to schedule this. If you have not heard from our endoscopy team within a week, please call [phone] option 2 to schedule.      Imaging Studies: If you were scheduled for a CT scan, X-ray, MRI, ultrasound, HIDA scan or other imaging study, please call [phone] to have this scheduled.      Referral: If a referral to another specialty was ordered, expect a phone call or follow instructions above. If you have not heard from anyone regarding your referral in a week, please call our clinic to check the status.      Who do I call with any questions after my visit?  Please be in touch if there are any further questions that arise following today's visit.  There are multiple ways to contact your gastroenterology care team.       During business hours, you may reach a Gastroenterology nurse at [phone]     To schedule or reschedule an appointment, please call [phone].      You can always send a secure message through P[employer]Vision.  P[employer] messages are answered by your nurse or doctor typically within 24 hours.  Please allow extra time on weekends and holidays.       For urgent/emergent questions after business hours, you may reach the on-call GI Fellow by contacting the [facility]  at [phone].     How will I get the results of any tests ordered?    You will receive all of your results.  If you have signed up for [employer], any tests ordered at your visit will be available to you after your physician reviews them.  Typically this takes 1-2 weeks.  If there are urgent results that require a change in your care plan, your physician or nurse will call you to discuss the next steps.       What is [employer]?  P[employer]ision is a secure way for you to access all of your healthcare records from the [facility].  It is a web based computer program, so you can sign on to it from any location.  It also allows you to send secure messages to your care team.  I recommend signing up for  Similar Pages access if you have not already done so and are comfortable with using a computer.       How to I schedule a follow-up visit?  If you did not schedule a follow-up visit today, please call [phone] to schedule a follow-up office visit.      Sincerely,    Chely Blue PA-C  Gastroenterology

## 2023-11-01 ENCOUNTER — PRE VISIT (OUTPATIENT)
Dept: SURGERY | Facility: CLINIC | Age: 23
End: 2023-11-01

## 2023-11-01 ENCOUNTER — ANCILLARY PROCEDURE (OUTPATIENT)
Dept: CT IMAGING | Facility: CLINIC | Age: 23
End: 2023-11-01
Attending: CLINICAL NURSE SPECIALIST
Payer: COMMERCIAL

## 2023-11-01 ENCOUNTER — ONCOLOGY VISIT (OUTPATIENT)
Dept: SURGERY | Facility: CLINIC | Age: 23
End: 2023-11-01
Attending: PHYSICIAN ASSISTANT
Payer: COMMERCIAL

## 2023-11-01 VITALS
TEMPERATURE: 97.5 F | HEART RATE: 86 BPM | RESPIRATION RATE: 16 BRPM | OXYGEN SATURATION: 98 % | WEIGHT: 157 LBS | SYSTOLIC BLOOD PRESSURE: 150 MMHG | BODY MASS INDEX: 22.85 KG/M2 | DIASTOLIC BLOOD PRESSURE: 89 MMHG

## 2023-11-01 DIAGNOSIS — K44.9 HIATAL HERNIA: ICD-10-CM

## 2023-11-01 DIAGNOSIS — K21.9 HIATAL HERNIA WITH GERD: Primary | ICD-10-CM

## 2023-11-01 DIAGNOSIS — K44.9 HIATAL HERNIA WITH GERD: Primary | ICD-10-CM

## 2023-11-01 PROCEDURE — 99213 OFFICE O/P EST LOW 20 MIN: CPT | Performed by: THORACIC SURGERY (CARDIOTHORACIC VASCULAR SURGERY)

## 2023-11-01 PROCEDURE — 71250 CT THORAX DX C-: CPT | Performed by: RADIOLOGY

## 2023-11-01 PROCEDURE — 99204 OFFICE O/P NEW MOD 45 MIN: CPT | Performed by: THORACIC SURGERY (CARDIOTHORACIC VASCULAR SURGERY)

## 2023-11-01 ASSESSMENT — PAIN SCALES - GENERAL: PAINLEVEL: NO PAIN (0)

## 2023-11-01 NOTE — NURSING NOTE
"Oncology Rooming Note    November 1, 2023 4:50 PM   Saturnino Matthews is a 23 year old male who presents for:    Chief Complaint   Patient presents with    Oncology Clinic Visit     Hiatal Hernia     Initial Vitals: BP (!) 150/89   Pulse 86   Temp 97.5  F (36.4  C) (Oral)   Resp 16   Wt 71.2 kg (157 lb)   SpO2 98%   BMI 22.85 kg/m   Estimated body mass index is 22.85 kg/m  as calculated from the following:    Height as of 10/25/23: 1.765 m (5' 9.5\").    Weight as of this encounter: 71.2 kg (157 lb). Body surface area is 1.87 meters squared.  No Pain (0) Comment: Data Unavailable   No LMP for male patient.  Allergies reviewed: Yes  Medications reviewed: Yes    Medications: Medication refills not needed today.  Pharmacy name entered into [employer]:    [id number] - [place], [place] - [address] [place] [address] NE AT INTERSECTION [address] [place] [address] DRUG STORE #[id number] [facility] [address] AT [id number]  [place], [place] - [address] AT [facility] [address]    Clinical concerns:  Pt would like to discuss occasional pain and pain when breathing.      Amaya Nelson CMA              "

## 2023-11-01 NOTE — LETTER
11/1/2023         RE: Saturnino Matthews  [address] [facility] [place] [address]        Dear Colleague,    Thank you for referring your patient, Saturnino Matthews, to the [facility]. Please see a copy of my visit note below.    THORACIC SURGERY - NEW PATIENT OFFICE VISIT      Dear Dr. Blue,    I saw Saturnino Matthews in consultation for the evaluation and treatment of GERD and hiatal hernia.     HPI  Saturnino Matthews is a 23 year old male who presented with long standing history of heartburn and regurgitation. He also has dysphagia. He is being referred for consideration of repair.     Previsit Tests   HRM pending results.   EGD 8/23/23: LA grade A esophagitis, hiatal hernia.       Esophagram 7/18/23: The gastroesophageal junction is patent. No hiatal hernia was seen on  examination. There was absence of gastroesophageal reflux in upright  positioin.        PMH  Acid reflux   Hiatal hernia   Palpitations     PSH    Past Surgical History:   Procedure Laterality Date    ENT SURGERY  Adenoids    ESOPHAGOSCOPY, GASTROSCOPY, DUODENOSCOPY (EGD), COMBINED N/A 8/23/2023    Procedure: ESOPHAGOGASTRODUODEOSCOPY WITH BIOPSY;  Surgeon: Naomi Hamilton MD;  Location: [facility] OR    ORTHOPEDIC SURGERY  12/2017    [place] Tooth Extraction Bilateral Lower and Upper.         Allergies   Allergen Reactions    Penicillins     Zithromax [Azithromycin]      Current Outpatient Medications   Medication    cetirizine (ZYRTEC) 10 MG tablet    omeprazole 20 MG tablet     No current facility-administered medications for this visit.     Social History     Tobacco Use    Smoking status: Never    Smokeless tobacco: Never    Tobacco comments:     No exposure.   Vaping Use    Vaping Use: Never used   Substance Use Topics    Alcohol use: Yes     Comment: Very occasional.    Drug use: Never       Physical examination  BP (!) 150/89   Pulse 86   Temp 97.5  F (36.4  C) (Oral)   Resp 16   Wt 71.2 kg (157 lb)    SpO2 98%   BMI 22.85 kg/m    Alert and oriented.   Bilateral breath sounds .      From a personal perspective, he comes to clinic with his mom.     IMPRESSION   23 year old male with GERD and hiatal hernia.      PLAN  I spent 30 min on the date of the encounter in chart review, patient visit, review of tests, documentation and/or discussion with other providers about the issues documented above. I reviewed the plan as follows:    We discussed his diagnosis and treatment options. We talked about the rationale for surgery, the alternatives, risks and benefits. I explain the pros and cons of a fundoplication vs. MSA. He expressed preference for a Nissen wrap. Informed consent was obtained and he agreed to proceed.     Procedure planned: laparoscopic hiatal hernia repair, Nissen fundoplication, no G tube.     Necessary Preop Tests & Appointments: PAC    Anticoagulation plan: Enoxaparin post op.     I appreciate the opportunity to participate in the care of your patient and will keep you updated.    Sincerely,    Cam Garner MD

## 2023-11-01 NOTE — PROGRESS NOTES
THORACIC SURGERY - NEW PATIENT OFFICE VISIT      Dear Dr. Blue,    I saw Saturnino Matthews in consultation for the evaluation and treatment of GERD and hiatal hernia.     HPI  Saturnino Matthews is a 23 year old male who presented with long standing history of heartburn and regurgitation. He also has dysphagia. He is being referred for consideration of repair.     Previsit Tests   HRM pending results.   EGD 8/23/23: LA grade A esophagitis, hiatal hernia.       Esophagram 7/18/23: The gastroesophageal junction is patent. No hiatal hernia was seen on  examination. There was absence of gastroesophageal reflux in upright  positioin.        PMH  Acid reflux   Hiatal hernia   Palpitations     PSH    Past Surgical History:   Procedure Laterality Date    ENT SURGERY  Adenoids    ESOPHAGOSCOPY, GASTROSCOPY, DUODENOSCOPY (EGD), COMBINED N/A 8/23/2023    Procedure: ESOPHAGOGASTRODUODEOSCOPY WITH BIOPSY;  Surgeon: Naomi Hamilton MD;  Location: [facility] OR    ORTHOPEDIC SURGERY  12/2017    [place] Tooth Extraction Bilateral Lower and Upper.         Allergies   Allergen Reactions    Penicillins     Zithromax [Azithromycin]      Current Outpatient Medications   Medication    cetirizine (ZYRTEC) 10 MG tablet    omeprazole 20 MG tablet     No current facility-administered medications for this visit.     Social History     Tobacco Use    Smoking status: Never    Smokeless tobacco: Never    Tobacco comments:     No exposure.   Vaping Use    Vaping Use: Never used   Substance Use Topics    Alcohol use: Yes     Comment: Very occasional.    Drug use: Never       Physical examination  BP (!) 150/89   Pulse 86   Temp 97.5  F (36.4  C) (Oral)   Resp 16   Wt 71.2 kg (157 lb)   SpO2 98%   BMI 22.85 kg/m    Alert and oriented.   Bilateral breath sounds .      From a personal perspective, he comes to clinic with his mom.     IMPRESSION   23 year old male with GERD and hiatal hernia.      PLAN  I spent 30 min on the date of the  encounter in chart review, patient visit, review of tests, documentation and/or discussion with other providers about the issues documented above. I reviewed the plan as follows:    We discussed his diagnosis and treatment options. We talked about the rationale for surgery, the alternatives, risks and benefits. I explain the pros and cons of a fundoplication vs. MSA. He expressed preference for a Nissen wrap. Informed consent was obtained and he agreed to proceed.     Procedure planned: laparoscopic hiatal hernia repair, Nissen fundoplication, no G tube.     Necessary Preop Tests & Appointments: PAC    Anticoagulation plan: Enoxaparin post op.     I appreciate the opportunity to participate in the care of your patient and will keep you updated.    Sincerely,    Cam Garner MD

## 2023-11-04 ENCOUNTER — PREP FOR PROCEDURE (OUTPATIENT)
Dept: SURGERY | Facility: CLINIC | Age: 23
End: 2023-11-04
Payer: COMMERCIAL

## 2023-11-04 DIAGNOSIS — K21.9 HIATAL HERNIA WITH GERD: Primary | ICD-10-CM

## 2023-11-04 DIAGNOSIS — K44.9 HIATAL HERNIA WITH GERD: Primary | ICD-10-CM

## 2023-11-04 RX ORDER — ACETAMINOPHEN 325 MG/1
975 TABLET ORAL ONCE
Status: CANCELLED | OUTPATIENT
Start: 2023-11-04 | End: 2023-11-04

## 2023-11-08 ENCOUNTER — TELEPHONE (OUTPATIENT)
Dept: SURGERY | Facility: CLINIC | Age: 23
End: 2023-11-08
Payer: COMMERCIAL

## 2023-11-08 DIAGNOSIS — K44.9 HIATAL HERNIA WITH GERD: Primary | ICD-10-CM

## 2023-11-08 DIAGNOSIS — K21.9 HIATAL HERNIA WITH GERD: Primary | ICD-10-CM

## 2023-11-08 NOTE — TELEPHONE ENCOUNTER
Spoke with patient to schedule procedure with Dr. Garcia   Procedure was scheduled on 12/22 at [facility] OR  Patient will have H&P with PAC    Patient is aware a COVID-19 test is needed before their procedure ONLY IF symptomatic.   (Patient is aware Thoracic is no longer requiring COVID-19 test)       Patient is aware a / is needed day of surgery.   Surgery Letter was sent via [employer],     Patient has my direct contact information for any further questions.

## 2023-11-08 NOTE — TELEPHONE ENCOUNTER
Called pt to offer 12/22 surgery date with Dr. Garcia and got no answer. Left voicemail message with direct call back number [phone].    Tea Diez on 11/8/2023 at 3:23 PM

## 2023-11-09 ENCOUNTER — PATIENT OUTREACH (OUTPATIENT)
Dept: SURGERY | Facility: CLINIC | Age: 23
End: 2023-11-09
Payer: COMMERCIAL

## 2023-11-09 NOTE — TELEPHONE ENCOUNTER
Called pt and LVM informing the need to schedule POP imaging.     Tea Diez on 11/9/2023 at 9:17 AM

## 2023-11-09 NOTE — TELEPHONE ENCOUNTER
Received call back from pt stating he would like to take the 12/29 surgery date. Pt has questions about his hospital stay. Informed pt writer would have the nurse call him to discuss.    Tea Diez on 11/9/2023 at 12:49 PM

## 2023-11-09 NOTE — TELEPHONE ENCOUNTER
FUTURE VISIT INFORMATION      SURGERY INFORMATION:  Date: 23  Location: [facility] or  Surgeon:  Cam Mandel MD   Anesthesia Type:  general  Procedure: Laparoscopic hiatal herniorrhaphy, Nissen fundoplication   Consult: ov 23    RECORDS REQUESTED FROM:       Primary Care Provider: [facility]    Pertinent Medical History: palpitations    Most recent EKG+ Tracin23

## 2023-11-09 NOTE — TELEPHONE ENCOUNTER
Called patient to follow up on message received that he had questions regarding his upcoming surgery.    Scheduled for Laparoscopic hiatal hernia repair, Nissen fundoplication, no G tube with Dr Garcia in December.      Reviewed estimated length of hospital stay and post-operative expectations (inpatient and after discharge). Encouraged questions and answered as able. The patient was instructed to stop ibuprofen, naproxen, NSAIDS, fish oil/flax seed oil, vitamins and supplements (including Vit E, CoQ10 and B2) at least one week before surgery.  The patient was instructed to notify the provider if there are any signs of a cold/flu prior to surgery and to take a Home Covid test.  Instructed to notify surgical team of any positive Home Covid tests prior to surgery. The patient was instructed to avoid smoking, chewing tobacco, or drinking alcohol for at least 1 week prior to surgery. Informed patient that a pre-admission nurse will be contacting him 1-3 days prior to his surgery to confirm hospital arrival time and review updated hospital guidelines.     The patient was notified that there will be post-op restrictions including: no heavy lifting >/= 20 lbs for 4-6 week. Explained that he will be given an Incentive Spirometer at his Pre-op appointment and he should practice using it before surgery, bring with to the hospital and continue to use their incentive spirometer daily after returning home. Spoke with patient about pain management and constipation prevention post-operatively.  Reviewed that patient will have a routine 1 month follow up appointment with imaging prior. Explained that the G-tube will remain in until that 4 week follow up appointment and will be removed at that time if appropriate. Reviewed basic G-tube cares, including when and how to vent tube. Patient verbalized his understanding.       Patient with no additional questions at this time. Patient instructed to call with further questions or concerns.   Patient verbalized understanding and is in agreement with this plan.  Patient given contact information and will reach out to care coordinator with additional questions or concerns.     Zoë Noble RN, BSN  Thoracic Surgery RN Care Coordinator

## 2023-11-10 ENCOUNTER — MYC MEDICAL ADVICE (OUTPATIENT)
Dept: GASTROENTEROLOGY | Facility: CLINIC | Age: 23
End: 2023-11-10

## 2023-11-10 DIAGNOSIS — R13.19 ESOPHAGEAL DYSPHAGIA: Primary | ICD-10-CM

## 2023-12-05 LAB
ABO/RH(D): NORMAL
ANTIBODY SCREEN: NEGATIVE
SPECIMEN EXPIRATION DATE: NORMAL

## 2023-12-06 ENCOUNTER — PRE VISIT (OUTPATIENT)
Dept: SURGERY | Facility: CLINIC | Age: 23
End: 2023-12-06

## 2023-12-06 ENCOUNTER — ANESTHESIA EVENT (OUTPATIENT)
Dept: SURGERY | Facility: CLINIC | Age: 23
End: 2023-12-06
Payer: COMMERCIAL

## 2023-12-06 ENCOUNTER — LAB (OUTPATIENT)
Dept: LAB | Facility: CLINIC | Age: 23
End: 2023-12-06
Payer: COMMERCIAL

## 2023-12-06 ENCOUNTER — OFFICE VISIT (OUTPATIENT)
Dept: SURGERY | Facility: CLINIC | Age: 23
End: 2023-12-06
Payer: COMMERCIAL

## 2023-12-06 VITALS
BODY MASS INDEX: 23.06 KG/M2 | DIASTOLIC BLOOD PRESSURE: 84 MMHG | HEART RATE: 89 BPM | TEMPERATURE: 97.8 F | HEIGHT: 70 IN | RESPIRATION RATE: 16 BRPM | SYSTOLIC BLOOD PRESSURE: 136 MMHG | OXYGEN SATURATION: 98 % | WEIGHT: 161.1 LBS

## 2023-12-06 DIAGNOSIS — Z01.818 PREOP EXAMINATION: Primary | ICD-10-CM

## 2023-12-06 DIAGNOSIS — Z01.818 PREOP EXAMINATION: ICD-10-CM

## 2023-12-06 DIAGNOSIS — K21.9 GASTROESOPHAGEAL REFLUX DISEASE, UNSPECIFIED WHETHER ESOPHAGITIS PRESENT: ICD-10-CM

## 2023-12-06 PROCEDURE — 99203 OFFICE O/P NEW LOW 30 MIN: CPT | Performed by: CLINICAL NURSE SPECIALIST

## 2023-12-06 PROCEDURE — 86901 BLOOD TYPING SEROLOGIC RH(D): CPT | Performed by: CLINICAL NURSE SPECIALIST

## 2023-12-06 PROCEDURE — 36415 COLL VENOUS BLD VENIPUNCTURE: CPT | Performed by: PATHOLOGY

## 2023-12-06 PROCEDURE — 86850 RBC ANTIBODY SCREEN: CPT | Performed by: CLINICAL NURSE SPECIALIST

## 2023-12-06 RX ORDER — MULTIPLE VITAMINS W/ MINERALS TAB 9MG-400MCG
1 TAB ORAL DAILY
COMMUNITY

## 2023-12-06 ASSESSMENT — PAIN SCALES - GENERAL: PAINLEVEL: NO PAIN (0)

## 2023-12-06 ASSESSMENT — ENCOUNTER SYMPTOMS
DYSRHYTHMIAS: 0
SEIZURES: 0

## 2023-12-06 ASSESSMENT — LIFESTYLE VARIABLES: TOBACCO_USE: 0

## 2023-12-06 NOTE — PATIENT INSTRUCTIONS
Preparing for Your Surgery      Name:  Saturnino Matthews   MRN:  [id number]   :  2000   Today's Date:  2023       Arriving for surgery:  Surgery date:  23  Arrival time:  7:00 am  Surgery time: 9:00 am    Please come to:     Please come to:      [facility] [address]  [address] [place]  [address]      The [facility] Patient /Visitor Ramp is located at [address]. Patients and visitors who self-park will receive the reduced hospital parking rate. If the Patient /Visitor Ramp is full, please follow the signs to the  parking located at the main hospital entrance.     parking is available ( 24 hours/ 7 days a week)    Discounted parking pass options are available for patients and visitors. They can be purchased at the [employer] desk at the main hospital entrance.    -    Stop at the security desk and they will direct surgery patients to the 3rd floor Surgery Waiting Room. [phone]     -  If you are in need of directions, wheelchair or escort please stop at the Information/security desk in the lobby.       What can I eat or drink?  -  You may eat and drink normally up to 8 hours prior to arrival time. (Until 11:00 pm on 23)  -  You may have clear liquids until 2 hours prior to arrival time. (Until 5:00 am on 23)    Examples of clear liquids:  Water  Clear broth  Juices (apple, white grape, white cranberry  and cider) without pulp  Noncarbonated, powder based beverages  (lemonade and Wilfredo-Aid)  Sodas (Sprite, 7-Up, ginger ale and seltzer)  Coffee or tea (without milk or cream)  Gatorade    -  No Alcohol or cannabis products for at least 24 hours before surgery.     Which medicines can I take?    Hold Aspirin for 7 days before surgery.   Hold Multivitamins for 7 days before surgery.  Hold Supplements for 7 days before surgery.  Hold Ibuprofen (Advil, Motrin) for 1 day(s) before  surgery--unless otherwise directed by surgeon.  Hold Naproxen (Aleve) for 4 days before surgery.    -  DO NOT take these medications the day of surgery:  Cetirizine (Zyrtec)  Multivitamin - see above  Riboflavin  Co Q 10 - stop 7 days before surgery    -  PLEASE TAKE these medications per your usual routine:  Omeprazole (Prilosec)     How do I prepare myself?  - Please take 2 showers (one the night prior to surgery and one the morning of surgery) using Scrubcare or Hibiclens soap.    Use this soap only from the neck to your toes.     Leave the soap on your skin for one minute--then rinse thoroughly.      You may use your own shampoo and conditioner. No other hair products.   - Please remove all jewelry and body piercings.  - No lotions, deodorants or fragrance.  - No makeup or fingernail polish.   - Bring your ID and insurance card.    -If you use a CPAP machine, please bring the CPAP machine, tubing, and mask to hospital.    -If you have a Deep Brain Stimulator, Spinal Cord Stimulator, or any Neuro Stimulator device---you must bring the remote control to the hospital.      ALL PATIENTS GOING HOME THE SAME DAY OF SURGERY ARE REQUIRED TO HAVE A RESPONSIBLE ADULT TO DRIVE AND BE IN ATTENDANCE WITH THEM FOR 24 HOURS FOLLOWING SURGERY.    Covid testing policy as of 12/06/2022  Your surgeon will notify and schedule you for a COVID test if one is needed before surgery--please direct any questions or COVID symptoms to your surgeon      Questions or Concerns:    - For any questions regarding the day of surgery or your hospital stay, please contact the Pre Admission Nursing Office at [phone].       - If you have health changes between today and your surgery, please call your surgeon.       - For questions after surgery, please call your surgeons office.           Current Visitor Guidelines    You may have 2 visitors in the pre op area.    Visiting hours: 8 a.m. to 8:30 p.m.    You may have four visitors during your  inpatient hospital stay.    Patients confirmed or suspected to have symptoms of COVID 19 or flu:     No visitors allowed for adult patients.   Children (under age 18) can have 1 named visitor.     People who are sick or showing symptoms of COVID 19 or flu:    Are not allowed to visit patients--we can only make exceptions in special situations.       Please follow these guidelines for your visit:          Please maintain social distance          Masking is optional--however at times you may be asked to wear a mask for the safety of yourself and others     Clean your hands with alcohol hand . Do this when you arrive at and leave the building and patient room,    And again after you touch your mask or anything in the room.     Go directly to and from the room you are visiting.     Stay in the patient s room during your visit. Limit going to other places in the hospital as much as possible     Leave bags and jackets at home or in the car.     For everyone s health, please don t come and go during your visit. That includes for smoking   during your visit.

## 2023-12-06 NOTE — H&P
Pre-Operative H & P     CC:  Preoperative exam to assess for increased cardiopulmonary risk while undergoing surgery and anesthesia.    Date of Encounter: 12/6/2023  Primary Care Physician:  [facility]     Reason for visit:   Encounter Diagnoses   Name Primary?    Preop examination Yes    Gastroesophageal reflux disease, unspecified whether esophagitis present        HPI  Saturnino Matthews is a 23 year old male who presents for pre-operative H & P in preparation for  Procedure Information       Case: [id number] Date/Time: 12/29/23 0900    Procedure: Laparoscopic hiatal herniorrhaphy, Nissen fundoplication (Abdomen)    Anesthesia type: General    Diagnosis: Hiatal hernia with GERD [K44.9, K21.9]    Pre-op diagnosis: Hiatal hernia with GERD [K44.9, K21.9]    Location:  OR [address] [place] [address] OR    Providers: Cam Mandel MD          History is obtained from the patient and chart review    Patient who has been recently evaluated by Dr. Jose Wagoner for longstanding issues with heartburn, regurgitation and dysphagia. An EGD on 8/23/23 revealed LA grade A esophagitis, and hiatal hernia. Surgical options were discussed and he was counseled for above procedures.     Patient's history is otherwise significant for allergies and ocular migraines. When younger he had a work up for palpitations and dizziness with no significant findings.       Hx of abnormal bleeding or anti-platelet use: Denies      Past Medical History  Past Medical History:   Diagnosis Date    Allergic reaction     Hiatal hernia with GERD     Ocular migraine        Past Surgical History  Past Surgical History:   Procedure Laterality Date    ENT SURGERY  Adenoids    ESOPHAGOSCOPY, GASTROSCOPY, DUODENOSCOPY (EGD), COMBINED N/A 8/23/2023    Procedure: ESOPHAGOGASTRODUODEOSCOPY WITH BIOPSY;  Surgeon: Naomi Hamilton MD;  Location: [facility] OR    ORTHOPEDIC SURGERY  12/2017    [place] Tooth Extraction Bilateral Lower and Upper.  Patients Plan of Care was received and signed. Signed POC was scanned and placed in the patients chart.     Gladys Hernández       Prior to Admission Medications  Current Outpatient Medications   Medication Sig Dispense Refill    multivitamin w/minerals (MULTI-VITAMIN) tablet Take 1 tablet by mouth daily      omeprazole 20 MG tablet Take 20 mg by mouth every morning      riboflavin 400 MG CAPS Take 400 mg by mouth daily      cetirizine (ZYRTEC) 10 MG tablet  (Patient not taking: Reported on 11/1/2023)         Allergies  Allergies   Allergen Reactions    Penicillins     Zithromax [Azithromycin]        Social History  Social History     Socioeconomic History    Marital status: Single     Spouse name: Not on file    Number of children: Not on file    Years of education: Not on file    Highest education level: Not on file   Occupational History    Not on file   Tobacco Use    Smoking status: Never     Passive exposure: Never    Smokeless tobacco: Never    Tobacco comments:     No exposure.   Vaping Use    Vaping Use: Never used   Substance and Sexual Activity    Alcohol use: Yes     Comment: Very occasional    Drug use: Never    Sexual activity: Yes     Partners: Female     Birth control/protection: Condom   Other Topics Concern    Parent/sibling w/ CABG, MI or angioplasty before 65F 55M? No   Social History Narrative    Not on file     Social Determinants of Health     Financial Resource Strain: Not on file   Food Insecurity: Not on file   Transportation Needs: Not on file   Physical Activity: Not on file   Stress: Not on file   Social Connections: Not on file   Interpersonal Safety: Not on file   Housing Stability: Not on file       Family History  Family History   Problem Relation Age of Onset    Hypertension Mother     Colon Cancer Father 48    Asthma Brother         Mild and developed in pre-teens.    Hypertension Maternal Grandmother     Diabetes Maternal Grandmother         Type 2 - Very prevalent on both sides of family    Aneurysm Maternal Grandfather         Brain    Melanoma Paternal Grandfather     Prostate Cancer Paternal  "Grandfather     Other - See Comments Other         A-fib.    Diabetes Other         My Maternal Uncle has Type 1 diabetes    Anesthesia Reaction No family hx of     Clotting Disorder No family hx of        Review of Systems  The complete review of systems is negative other than noted in the HPI or here.   Anesthesia Evaluation   Pt has had prior anesthetic. Type: General and MAC.    No history of anesthetic complications       ROS/MED HX  ENT/Pulmonary:    (-) tobacco use and recent URI   Neurologic: Comment: History of ocular migraines    (+)      migraines,                       (-) no seizures   Cardiovascular:     (+)  - -   -  - -                                    Echo: Date: Results:    Stress Test:  Date: Results:    ECG Reviewed:  Date: 6/30/23 Results:  SR  Cath:  Date: Results:   (-) taking anticoagulants/antiplatelets, MERRITT and arrhythmias   METS/Exercise Tolerance: >4 METS    Hematologic:    (-) history of blood clots and history of blood transfusion   Musculoskeletal:  - neg musculoskeletal ROS     GI/Hepatic:     (+) GERD, Symptomatic,    hiatal hernia,              Renal/Genitourinary:  - neg Renal ROS     Endo:  - neg endo ROS     Psychiatric/Substance Use:    (-) psychiatric history   Infectious Disease:  - neg infectious disease ROS     Malignancy:  - neg malignancy ROS     Other:  - neg other ROS          /84 (BP Location: Right arm, Patient Position: Sitting, Cuff Size: Adult Regular)   Pulse 89   Temp 97.8  F (36.6  C) (Oral)   Resp 16   Ht 1.765 m (5' 9.5\")   Wt 73.1 kg (161 lb 1.6 oz)   SpO2 98%   BMI 23.45 kg/m      Physical Exam   Constitutional: Awake, alert, cooperative, no apparent distress, and appears stated age.   Eyes: Pupils equal, round and reactive to light, extra ocular muscles intact, sclera clear, conjunctiva normal.  HENT: Normocephalic, oral pharynx with moist mucus membranes, good dentition. No goiter appreciated.   Respiratory: Clear to auscultation bilaterally, " no crackles or wheezing. No cough or obvious dyspnea.  Cardiovascular: Regular rate and rhythm, normal S1 and S2, and no murmur noted.  Carotids +2, no bruits. No edema. Palpable pulses to radial  DP and PT arteries.   GI: Normal bowel sounds, soft, non-distended, non-tender, no masses palpated, no hepatosplenomegaly.    Lymph/Hematologic: No cervical lymphadenopathy and no supraclavicular lymphadenopathy.  Genitourinary:  Deferred.   Skin: Warm and dry.  No rashes at anticipated surgical site.   Musculoskeletal: Full ROM of neck. There is no redness, warmth, or swelling of the joints. Gross motor strength is normal.    Neurologic: Awake, alert, oriented to name, place and time. Cranial nerves II-XII are grossly intact. Gait is normal.   Neuropsychiatric: Calm, cooperative. Normal affect.     Prior Labs/Diagnostic Studies   All labs and imaging personally reviewed   Lab Results   Component Value Date    WBC 5.8 06/30/2023    WBC 6.2 08/05/2016     Lab Results   Component Value Date    RBC 5.44 06/30/2023    RBC 5.42 08/05/2016     Lab Results   Component Value Date    HGB 16.3 06/30/2023    HGB 16.5 08/05/2016     Lab Results   Component Value Date    HCT 47.0 06/30/2023    HCT 47.9 08/05/2016     Lab Results   Component Value Date    MCV 86 06/30/2023    MCV 88 08/05/2016     Lab Results   Component Value Date    MCH 30.0 06/30/2023    MCH 30.4 08/05/2016     Lab Results   Component Value Date    MCHC 34.7 06/30/2023    MCHC 34.4 08/05/2016     Lab Results   Component Value Date    RDW 12.3 06/30/2023    RDW 12.6 08/05/2016     Lab Results   Component Value Date     06/30/2023     08/05/2016     Last Comprehensive Metabolic Panel:  Lab Results   Component Value Date     06/30/2023    POTASSIUM 4.1 06/30/2023    CHLORIDE 98 06/30/2023    CO2 26 06/30/2023    ANIONGAP 13 06/30/2023    GLC 91 06/30/2023    BUN 17.6 06/30/2023    CR 0.82 06/30/2023    GFRESTIMATED >90 06/30/2023    SAWYER 9.9  2023     EK23 Sinus rhythm    23 CT chest                                                                     IMPRESSION: Questionable hiatal hernia formation. No suspicious  pulmonary finding.    10/25/23 MR Brain  INDINGS: No restricted diffusion, gradient signal abnormality or mass is identified. Ventricles are normal in size and configuration. Brain volume is normal. No abnormal parenchymal, leptomeningeal or dural enhancement is identified.     Minor ethmoid and maxillary sinus disease. Remaining paranasal sinuses and mastoid air cells are clear.     Cervicomedullary junction is normal. Dural sinuses are patent. Intravascular flow voids are maintained at the skull base     23 Xray esophagram                                                         Impression:   1. No significant barium column at 0 and 1 minute intervals.  2. Barium tablet passed below the gastroesophageal junction  immediately without difficulty.      The patient's records and results personally reviewed by this provider.     Outside records reviewed from: Care Everywhere    LAB/DIAGNOSTIC STUDIES TODAY:  Type and screen    Assessment    Saturnino Matthews is a 23 year old male seen as a PAC referral for risk assessment and optimization for anesthesia.    Plan/Recommendations  Pt will be optimized for the proposed procedure.  See below for details on the assessment, risk, and preoperative recommendations    NEUROLOGY  - No history of TIA, CVA or seizure  - History of ocular migraine. Evaluated by Neurology in past. No specific meds  -Post Op delirium risk factors:  No risk identified    ENT  - No current airway concerns.  Will need to be reassessed day of surgery.  Mallampati: I  TM: > 3    CARDIAC  History of work up for palpitations and dizziness in 2016 with no specific findings. No other cardiac history, symptoms or meds. Good exercise tolerance.  - METS (Metabolic Equivalents)>4    RCRI: 0.9% risk of serious cardiac  "events    PULMONARY  Allergies. Zyrtec prn but will not take on DOS  Denies asthma, cough or shortness of breath  Low risk for MISSY  - Tobacco History    History   Smoking Status    Never   Smokeless Tobacco    Never       GI: GERD. Occasional swallowing difficulty. Will take omeprazole on DOS  PONV Low Risk  Total Score: 1           1 AN PONV: Patient is not a current smoker        /RENAL  - Baseline Creatinine  0.82    ENDOCRINE    - BMI: Estimated body mass index is 23.45 kg/m  as calculated from the following:    Height as of this encounter: 1.765 m (5' 9.5\").    Weight as of this encounter: 73.1 kg (161 lb 1.6 oz).  Healthy Weight (BMI 18.5-24.9)  - No history of Diabetes Mellitus    HEME  VTE Low Risk 0.26%            Total Score: 0      Denies personal or family history of blood clots  Denies history of blood transfusion     Different anesthesia methods/types have been discussed with the patient, but they are aware that the final plan will be decided by the assigned anesthesia provider on the date of service.      The patient is optimized for their procedure. AVS with information on surgery time/arrival time, meds and NPO status given by nursing staff. No further diagnostic testing indicated.      On the day of service:     Prep time: 10 minutes  Visit time: 14 minutes  Documentation time: 12 minutes  ------------------------------------------  Total time: 36 minutes      LUC Francis CNS  Preoperative Assessment Center  [facility]  Phone: [phone]  Fax: [phone]    "

## 2023-12-10 ENCOUNTER — MYC MEDICAL ADVICE (OUTPATIENT)
Dept: FAMILY MEDICINE | Facility: CLINIC | Age: 23
End: 2023-12-10
Payer: COMMERCIAL

## 2023-12-10 DIAGNOSIS — Z78.9 VARICELLA VACCINATION STATUS UNKNOWN: ICD-10-CM

## 2023-12-10 DIAGNOSIS — Z11.1 SCREENING EXAMINATION FOR PULMONARY TUBERCULOSIS: Primary | ICD-10-CM

## 2023-12-21 ENCOUNTER — PRE VISIT (OUTPATIENT)
Dept: NEUROLOGY | Facility: CLINIC | Age: 23
End: 2023-12-21

## 2023-12-22 ENCOUNTER — LAB (OUTPATIENT)
Dept: LAB | Facility: CLINIC | Age: 23
End: 2023-12-22
Payer: COMMERCIAL

## 2023-12-22 ENCOUNTER — TELEPHONE (OUTPATIENT)
Dept: FAMILY MEDICINE | Facility: CLINIC | Age: 23
End: 2023-12-22

## 2023-12-22 DIAGNOSIS — Z11.1 SCREENING EXAMINATION FOR PULMONARY TUBERCULOSIS: ICD-10-CM

## 2023-12-22 DIAGNOSIS — Z78.9 VARICELLA VACCINATION STATUS UNKNOWN: ICD-10-CM

## 2023-12-22 PROCEDURE — 86481 TB AG RESPONSE T-CELL SUSP: CPT

## 2023-12-22 PROCEDURE — 36415 COLL VENOUS BLD VENIPUNCTURE: CPT

## 2023-12-22 PROCEDURE — 86787 VARICELLA-ZOSTER ANTIBODY: CPT

## 2023-12-25 LAB
GAMMA INTERFERON BACKGROUND BLD IA-ACNC: 0 IU/ML
M TB IFN-G BLD-IMP: NEGATIVE
M TB IFN-G CD4+ BCKGRND COR BLD-ACNC: 10 IU/ML
MITOGEN IGNF BCKGRD COR BLD-ACNC: 0 IU/ML
MITOGEN IGNF BCKGRD COR BLD-ACNC: 0.01 IU/ML
QUANTIFERON MITOGEN: 10 IU/ML
QUANTIFERON NIL TUBE: 0 IU/ML
QUANTIFERON TB1 TUBE: 0 IU/ML
QUANTIFERON TB2 TUBE: 0.01

## 2023-12-26 LAB
VZV IGG SER QL IA: >4000 INDEX
VZV IGG SER QL IA: POSITIVE

## 2023-12-29 ENCOUNTER — ANESTHESIA (OUTPATIENT)
Dept: SURGERY | Facility: CLINIC | Age: 23
End: 2023-12-29
Payer: COMMERCIAL

## 2023-12-29 ENCOUNTER — APPOINTMENT (OUTPATIENT)
Dept: GENERAL RADIOLOGY | Facility: CLINIC | Age: 23
End: 2023-12-29
Payer: COMMERCIAL

## 2023-12-29 ENCOUNTER — HOSPITAL ENCOUNTER (INPATIENT)
Facility: CLINIC | Age: 23
LOS: 2 days | Discharge: HOME | End: 2023-12-31
Attending: THORACIC SURGERY (CARDIOTHORACIC VASCULAR SURGERY) | Admitting: THORACIC SURGERY (CARDIOTHORACIC VASCULAR SURGERY)
Payer: COMMERCIAL

## 2023-12-29 DIAGNOSIS — K44.9 HIATAL HERNIA: Primary | ICD-10-CM

## 2023-12-29 LAB
ABO/RH(D): NORMAL
ANTIBODY SCREEN: NEGATIVE
CREAT SERPL-MCNC: 0.9 MG/DL (ref 0.67–1.17)
EGFRCR SERPLBLD CKD-EPI 2021: >90 ML/MIN/1.73M2
GLUCOSE BLDC GLUCOMTR-MCNC: 81 MG/DL (ref 70–99)
HOLD SPECIMEN: NORMAL
PHOSPHATE SERPL-MCNC: 2.7 MG/DL (ref 2.5–4.5)
SPECIMEN EXPIRATION DATE: NORMAL

## 2023-12-29 PROCEDURE — 250N000011 HC RX IP 250 OP 636: Performed by: THORACIC SURGERY (CARDIOTHORACIC VASCULAR SURGERY)

## 2023-12-29 PROCEDURE — 360N000077 HC SURGERY LEVEL 4, PER MIN: Performed by: THORACIC SURGERY (CARDIOTHORACIC VASCULAR SURGERY)

## 2023-12-29 PROCEDURE — 71045 X-RAY EXAM CHEST 1 VIEW: CPT | Mod: 26 | Performed by: STUDENT IN AN ORGANIZED HEALTH CARE EDUCATION/TRAINING PROGRAM

## 2023-12-29 PROCEDURE — 43281 LAP PARAESOPHAG HERN REPAIR: CPT | Mod: GC | Performed by: THORACIC SURGERY (CARDIOTHORACIC VASCULAR SURGERY)

## 2023-12-29 PROCEDURE — 32551 INSERTION OF CHEST TUBE: CPT | Mod: RT | Performed by: THORACIC SURGERY (CARDIOTHORACIC VASCULAR SURGERY)

## 2023-12-29 PROCEDURE — 36415 COLL VENOUS BLD VENIPUNCTURE: CPT

## 2023-12-29 PROCEDURE — 93010 ELECTROCARDIOGRAM REPORT: CPT | Performed by: INTERNAL MEDICINE

## 2023-12-29 PROCEDURE — 71045 X-RAY EXAM CHEST 1 VIEW: CPT | Mod: 26 | Performed by: RADIOLOGY

## 2023-12-29 PROCEDURE — 272N000001 HC OR GENERAL SUPPLY STERILE: Performed by: THORACIC SURGERY (CARDIOTHORACIC VASCULAR SURGERY)

## 2023-12-29 PROCEDURE — 84100 ASSAY OF PHOSPHORUS: CPT

## 2023-12-29 PROCEDURE — 710N000010 HC RECOVERY PHASE 1, LEVEL 2, PER MIN: Performed by: THORACIC SURGERY (CARDIOTHORACIC VASCULAR SURGERY)

## 2023-12-29 PROCEDURE — 999N000065 XR CHEST PORT 1 VIEW

## 2023-12-29 PROCEDURE — 999N000141 HC STATISTIC PRE-PROCEDURE NURSING ASSESSMENT: Performed by: THORACIC SURGERY (CARDIOTHORACIC VASCULAR SURGERY)

## 2023-12-29 PROCEDURE — 250N000013 HC RX MED GY IP 250 OP 250 PS 637: Performed by: THORACIC SURGERY (CARDIOTHORACIC VASCULAR SURGERY)

## 2023-12-29 PROCEDURE — 86900 BLOOD TYPING SEROLOGIC ABO: CPT | Performed by: THORACIC SURGERY (CARDIOTHORACIC VASCULAR SURGERY)

## 2023-12-29 PROCEDURE — 250N000011 HC RX IP 250 OP 636

## 2023-12-29 PROCEDURE — 93005 ELECTROCARDIOGRAM TRACING: CPT

## 2023-12-29 PROCEDURE — 250N000013 HC RX MED GY IP 250 OP 250 PS 637

## 2023-12-29 PROCEDURE — 82565 ASSAY OF CREATININE: CPT

## 2023-12-29 PROCEDURE — 71045 X-RAY EXAM CHEST 1 VIEW: CPT

## 2023-12-29 PROCEDURE — 250N000011 HC RX IP 250 OP 636: Mod: JZ | Performed by: THORACIC SURGERY (CARDIOTHORACIC VASCULAR SURGERY)

## 2023-12-29 PROCEDURE — 258N000003 HC RX IP 258 OP 636: Performed by: NURSE ANESTHETIST, CERTIFIED REGISTERED

## 2023-12-29 PROCEDURE — 250N000011 HC RX IP 250 OP 636: Performed by: NURSE ANESTHETIST, CERTIFIED REGISTERED

## 2023-12-29 PROCEDURE — 250N000009 HC RX 250: Performed by: NURSE ANESTHETIST, CERTIFIED REGISTERED

## 2023-12-29 PROCEDURE — 0DV44ZZ RESTRICTION OF ESOPHAGOGASTRIC JUNCTION, PERCUTANEOUS ENDOSCOPIC APPROACH: ICD-10-PCS | Performed by: THORACIC SURGERY (CARDIOTHORACIC VASCULAR SURGERY)

## 2023-12-29 PROCEDURE — 250N000011 HC RX IP 250 OP 636: Performed by: STUDENT IN AN ORGANIZED HEALTH CARE EDUCATION/TRAINING PROGRAM

## 2023-12-29 PROCEDURE — 120N000002 HC R&B MED SURG/OB UMMC

## 2023-12-29 PROCEDURE — 258N000003 HC RX IP 258 OP 636

## 2023-12-29 PROCEDURE — 370N000017 HC ANESTHESIA TECHNICAL FEE, PER MIN: Performed by: THORACIC SURGERY (CARDIOTHORACIC VASCULAR SURGERY)

## 2023-12-29 PROCEDURE — 250N000009 HC RX 250: Performed by: THORACIC SURGERY (CARDIOTHORACIC VASCULAR SURGERY)

## 2023-12-29 PROCEDURE — 36415 COLL VENOUS BLD VENIPUNCTURE: CPT | Performed by: THORACIC SURGERY (CARDIOTHORACIC VASCULAR SURGERY)

## 2023-12-29 PROCEDURE — 0BQT4ZZ REPAIR DIAPHRAGM, PERCUTANEOUS ENDOSCOPIC APPROACH: ICD-10-PCS | Performed by: THORACIC SURGERY (CARDIOTHORACIC VASCULAR SURGERY)

## 2023-12-29 PROCEDURE — 250N000025 HC SEVOFLURANE, PER MIN: Performed by: THORACIC SURGERY (CARDIOTHORACIC VASCULAR SURGERY)

## 2023-12-29 PROCEDURE — 0DJ08ZZ INSPECTION OF UPPER INTESTINAL TRACT, VIA NATURAL OR ARTIFICIAL OPENING ENDOSCOPIC: ICD-10-PCS | Performed by: THORACIC SURGERY (CARDIOTHORACIC VASCULAR SURGERY)

## 2023-12-29 RX ORDER — FENTANYL CITRATE 50 UG/ML
25 INJECTION, SOLUTION INTRAMUSCULAR; INTRAVENOUS EVERY 5 MIN PRN
Status: DISCONTINUED | OUTPATIENT
Start: 2023-12-29 | End: 2023-12-29 | Stop reason: HOSPADM

## 2023-12-29 RX ORDER — ACETAMINOPHEN 325 MG/1
650 TABLET ORAL EVERY 4 HOURS PRN
Status: DISCONTINUED | OUTPATIENT
Start: 2024-01-01 | End: 2023-12-31 | Stop reason: HOSPADM

## 2023-12-29 RX ORDER — EPHEDRINE SULFATE 50 MG/ML
INJECTION, SOLUTION INTRAMUSCULAR; INTRAVENOUS; SUBCUTANEOUS PRN
Status: DISCONTINUED | OUTPATIENT
Start: 2023-12-29 | End: 2023-12-29

## 2023-12-29 RX ORDER — SODIUM CHLORIDE, SODIUM LACTATE, POTASSIUM CHLORIDE, CALCIUM CHLORIDE 600; 310; 30; 20 MG/100ML; MG/100ML; MG/100ML; MG/100ML
INJECTION, SOLUTION INTRAVENOUS CONTINUOUS PRN
Status: DISCONTINUED | OUTPATIENT
Start: 2023-12-29 | End: 2023-12-29

## 2023-12-29 RX ORDER — ACETAMINOPHEN 325 MG/1
975 TABLET ORAL ONCE
Status: DISCONTINUED | OUTPATIENT
Start: 2023-12-29 | End: 2023-12-29 | Stop reason: HOSPADM

## 2023-12-29 RX ORDER — BUPIVACAINE HYDROCHLORIDE 2.5 MG/ML
INJECTION, SOLUTION INFILTRATION; PERINEURAL PRN
Status: DISCONTINUED | OUTPATIENT
Start: 2023-12-29 | End: 2023-12-29 | Stop reason: HOSPADM

## 2023-12-29 RX ORDER — FENTANYL CITRATE 50 UG/ML
50 INJECTION, SOLUTION INTRAMUSCULAR; INTRAVENOUS EVERY 5 MIN PRN
Status: DISCONTINUED | OUTPATIENT
Start: 2023-12-29 | End: 2023-12-29 | Stop reason: HOSPADM

## 2023-12-29 RX ORDER — OXYCODONE HYDROCHLORIDE 10 MG/1
10 TABLET ORAL EVERY 4 HOURS PRN
Status: DISCONTINUED | OUTPATIENT
Start: 2023-12-29 | End: 2023-12-31 | Stop reason: HOSPADM

## 2023-12-29 RX ORDER — PROCHLORPERAZINE MALEATE 5 MG
10 TABLET ORAL EVERY 6 HOURS PRN
Status: DISCONTINUED | OUTPATIENT
Start: 2023-12-29 | End: 2023-12-31 | Stop reason: HOSPADM

## 2023-12-29 RX ORDER — ENOXAPARIN SODIUM 100 MG/ML
40 INJECTION SUBCUTANEOUS EVERY 24 HOURS
Status: DISCONTINUED | OUTPATIENT
Start: 2023-12-30 | End: 2023-12-31 | Stop reason: HOSPADM

## 2023-12-29 RX ORDER — CLINDAMYCIN PHOSPHATE 900 MG/50ML
900 INJECTION, SOLUTION INTRAVENOUS SEE ADMIN INSTRUCTIONS
Status: DISCONTINUED | OUTPATIENT
Start: 2023-12-29 | End: 2023-12-29 | Stop reason: HOSPADM

## 2023-12-29 RX ORDER — NALOXONE HYDROCHLORIDE 0.4 MG/ML
0.2 INJECTION, SOLUTION INTRAMUSCULAR; INTRAVENOUS; SUBCUTANEOUS
Status: DISCONTINUED | OUTPATIENT
Start: 2023-12-29 | End: 2023-12-31 | Stop reason: HOSPADM

## 2023-12-29 RX ORDER — IBUPROFEN 600 MG/1
600 TABLET, FILM COATED ORAL EVERY 6 HOURS PRN
Status: DISCONTINUED | OUTPATIENT
Start: 2023-12-29 | End: 2023-12-31 | Stop reason: HOSPADM

## 2023-12-29 RX ORDER — HYDROMORPHONE HCL IN WATER/PF 6 MG/30 ML
0.2 PATIENT CONTROLLED ANALGESIA SYRINGE INTRAVENOUS EVERY 5 MIN PRN
Status: DISCONTINUED | OUTPATIENT
Start: 2023-12-29 | End: 2023-12-29 | Stop reason: HOSPADM

## 2023-12-29 RX ORDER — ACETAMINOPHEN 325 MG/1
975 TABLET ORAL ONCE
Status: COMPLETED | OUTPATIENT
Start: 2023-12-29 | End: 2023-12-29

## 2023-12-29 RX ORDER — LIDOCAINE HYDROCHLORIDE 20 MG/ML
INJECTION, SOLUTION INFILTRATION; PERINEURAL PRN
Status: DISCONTINUED | OUTPATIENT
Start: 2023-12-29 | End: 2023-12-29

## 2023-12-29 RX ORDER — ONDANSETRON 4 MG/1
4 TABLET, ORALLY DISINTEGRATING ORAL EVERY 6 HOURS PRN
Status: DISCONTINUED | OUTPATIENT
Start: 2023-12-29 | End: 2023-12-31 | Stop reason: HOSPADM

## 2023-12-29 RX ORDER — ONDANSETRON 2 MG/ML
4 INJECTION INTRAMUSCULAR; INTRAVENOUS EVERY 6 HOURS PRN
Status: DISCONTINUED | OUTPATIENT
Start: 2023-12-29 | End: 2023-12-31 | Stop reason: HOSPADM

## 2023-12-29 RX ORDER — NALOXONE HYDROCHLORIDE 0.4 MG/ML
0.4 INJECTION, SOLUTION INTRAMUSCULAR; INTRAVENOUS; SUBCUTANEOUS
Status: DISCONTINUED | OUTPATIENT
Start: 2023-12-29 | End: 2023-12-31 | Stop reason: HOSPADM

## 2023-12-29 RX ORDER — OXYCODONE HYDROCHLORIDE 5 MG/1
5 TABLET ORAL EVERY 4 HOURS PRN
Status: DISCONTINUED | OUTPATIENT
Start: 2023-12-29 | End: 2023-12-31 | Stop reason: HOSPADM

## 2023-12-29 RX ORDER — ONDANSETRON 2 MG/ML
INJECTION INTRAMUSCULAR; INTRAVENOUS PRN
Status: DISCONTINUED | OUTPATIENT
Start: 2023-12-29 | End: 2023-12-29

## 2023-12-29 RX ORDER — BISACODYL 10 MG
10 SUPPOSITORY, RECTAL RECTAL DAILY PRN
Status: DISCONTINUED | OUTPATIENT
Start: 2023-12-29 | End: 2023-12-31 | Stop reason: HOSPADM

## 2023-12-29 RX ORDER — LABETALOL HYDROCHLORIDE 5 MG/ML
10 INJECTION, SOLUTION INTRAVENOUS
Status: DISCONTINUED | OUTPATIENT
Start: 2023-12-29 | End: 2023-12-29 | Stop reason: HOSPADM

## 2023-12-29 RX ORDER — ONDANSETRON 2 MG/ML
4 INJECTION INTRAMUSCULAR; INTRAVENOUS EVERY 30 MIN PRN
Status: DISCONTINUED | OUTPATIENT
Start: 2023-12-29 | End: 2023-12-29 | Stop reason: HOSPADM

## 2023-12-29 RX ORDER — AMOXICILLIN 250 MG
1 CAPSULE ORAL 2 TIMES DAILY
Status: DISCONTINUED | OUTPATIENT
Start: 2023-12-29 | End: 2023-12-31 | Stop reason: HOSPADM

## 2023-12-29 RX ORDER — SODIUM CHLORIDE, SODIUM LACTATE, POTASSIUM CHLORIDE, CALCIUM CHLORIDE 600; 310; 30; 20 MG/100ML; MG/100ML; MG/100ML; MG/100ML
INJECTION, SOLUTION INTRAVENOUS CONTINUOUS
Status: DISCONTINUED | OUTPATIENT
Start: 2023-12-29 | End: 2023-12-31 | Stop reason: HOSPADM

## 2023-12-29 RX ORDER — ONDANSETRON 4 MG/1
4 TABLET, ORALLY DISINTEGRATING ORAL EVERY 30 MIN PRN
Status: DISCONTINUED | OUTPATIENT
Start: 2023-12-29 | End: 2023-12-29 | Stop reason: HOSPADM

## 2023-12-29 RX ORDER — FENTANYL CITRATE 50 UG/ML
INJECTION, SOLUTION INTRAMUSCULAR; INTRAVENOUS PRN
Status: DISCONTINUED | OUTPATIENT
Start: 2023-12-29 | End: 2023-12-29

## 2023-12-29 RX ORDER — HYDROMORPHONE HCL IN WATER/PF 6 MG/30 ML
0.4 PATIENT CONTROLLED ANALGESIA SYRINGE INTRAVENOUS
Status: DISCONTINUED | OUTPATIENT
Start: 2023-12-29 | End: 2023-12-31 | Stop reason: HOSPADM

## 2023-12-29 RX ORDER — SODIUM CHLORIDE, SODIUM LACTATE, POTASSIUM CHLORIDE, CALCIUM CHLORIDE 600; 310; 30; 20 MG/100ML; MG/100ML; MG/100ML; MG/100ML
INJECTION, SOLUTION INTRAVENOUS CONTINUOUS
Status: DISCONTINUED | OUTPATIENT
Start: 2023-12-29 | End: 2023-12-29 | Stop reason: HOSPADM

## 2023-12-29 RX ORDER — HYDROMORPHONE HCL IN WATER/PF 6 MG/30 ML
0.4 PATIENT CONTROLLED ANALGESIA SYRINGE INTRAVENOUS EVERY 5 MIN PRN
Status: DISCONTINUED | OUTPATIENT
Start: 2023-12-29 | End: 2023-12-29 | Stop reason: HOSPADM

## 2023-12-29 RX ORDER — HYDROMORPHONE HCL IN WATER/PF 6 MG/30 ML
0.2 PATIENT CONTROLLED ANALGESIA SYRINGE INTRAVENOUS
Status: DISCONTINUED | OUTPATIENT
Start: 2023-12-29 | End: 2023-12-31 | Stop reason: HOSPADM

## 2023-12-29 RX ORDER — PROPOFOL 10 MG/ML
INJECTION, EMULSION INTRAVENOUS PRN
Status: DISCONTINUED | OUTPATIENT
Start: 2023-12-29 | End: 2023-12-29

## 2023-12-29 RX ORDER — METHOCARBAMOL 750 MG/1
750 TABLET, FILM COATED ORAL EVERY 6 HOURS PRN
Status: DISCONTINUED | OUTPATIENT
Start: 2023-12-29 | End: 2023-12-31 | Stop reason: HOSPADM

## 2023-12-29 RX ORDER — POLYETHYLENE GLYCOL 3350 17 G/17G
17 POWDER, FOR SOLUTION ORAL DAILY
Status: DISCONTINUED | OUTPATIENT
Start: 2023-12-30 | End: 2023-12-31 | Stop reason: HOSPADM

## 2023-12-29 RX ORDER — ACETAMINOPHEN 325 MG/1
975 TABLET ORAL EVERY 8 HOURS
Status: DISCONTINUED | OUTPATIENT
Start: 2023-12-29 | End: 2023-12-31 | Stop reason: HOSPADM

## 2023-12-29 RX ORDER — CLINDAMYCIN PHOSPHATE 900 MG/50ML
900 INJECTION, SOLUTION INTRAVENOUS
Status: COMPLETED | OUTPATIENT
Start: 2023-12-29 | End: 2023-12-29

## 2023-12-29 RX ORDER — DEXAMETHASONE SODIUM PHOSPHATE 4 MG/ML
INJECTION, SOLUTION INTRA-ARTICULAR; INTRALESIONAL; INTRAMUSCULAR; INTRAVENOUS; SOFT TISSUE PRN
Status: DISCONTINUED | OUTPATIENT
Start: 2023-12-29 | End: 2023-12-29

## 2023-12-29 RX ORDER — CALCIUM CARBONATE 500 MG/1
500 TABLET, CHEWABLE ORAL 4 TIMES DAILY PRN
Status: DISCONTINUED | OUTPATIENT
Start: 2023-12-29 | End: 2023-12-31 | Stop reason: HOSPADM

## 2023-12-29 RX ADMIN — OXYCODONE HYDROCHLORIDE 10 MG: 10 TABLET ORAL at 22:03

## 2023-12-29 RX ADMIN — EPHEDRINE SULFATE 15 MG: 5 INJECTION INTRAVENOUS at 09:33

## 2023-12-29 RX ADMIN — FENTANYL CITRATE 50 MCG: 50 INJECTION, SOLUTION INTRAMUSCULAR; INTRAVENOUS at 11:50

## 2023-12-29 RX ADMIN — EPHEDRINE SULFATE 10 MG: 5 INJECTION INTRAVENOUS at 09:34

## 2023-12-29 RX ADMIN — Medication 20 MG: at 09:37

## 2023-12-29 RX ADMIN — METHOCARBAMOL 750 MG: 750 TABLET ORAL at 22:18

## 2023-12-29 RX ADMIN — DEXAMETHASONE SODIUM PHOSPHATE 8 MG: 4 INJECTION, SOLUTION INTRA-ARTICULAR; INTRALESIONAL; INTRAMUSCULAR; INTRAVENOUS; SOFT TISSUE at 08:20

## 2023-12-29 RX ADMIN — PHENYLEPHRINE HYDROCHLORIDE 200 MCG: 10 INJECTION INTRAVENOUS at 08:53

## 2023-12-29 RX ADMIN — ACETAMINOPHEN 975 MG: 325 TABLET, FILM COATED ORAL at 15:49

## 2023-12-29 RX ADMIN — FENTANYL CITRATE 100 MCG: 50 INJECTION INTRAMUSCULAR; INTRAVENOUS at 07:50

## 2023-12-29 RX ADMIN — SODIUM CHLORIDE, POTASSIUM CHLORIDE, SODIUM LACTATE AND CALCIUM CHLORIDE: 600; 310; 30; 20 INJECTION, SOLUTION INTRAVENOUS at 07:31

## 2023-12-29 RX ADMIN — SENNOSIDES AND DOCUSATE SODIUM 1 TABLET: 50; 8.6 TABLET ORAL at 19:39

## 2023-12-29 RX ADMIN — Medication 20 MG: at 10:11

## 2023-12-29 RX ADMIN — OXYCODONE HYDROCHLORIDE 10 MG: 10 TABLET ORAL at 18:16

## 2023-12-29 RX ADMIN — PROPOFOL 180 MG: 10 INJECTION, EMULSION INTRAVENOUS at 07:50

## 2023-12-29 RX ADMIN — Medication 20 MG: at 08:42

## 2023-12-29 RX ADMIN — METHOCARBAMOL 750 MG: 750 TABLET ORAL at 15:49

## 2023-12-29 RX ADMIN — DEXMEDETOMIDINE HYDROCHLORIDE 12 MCG: 100 INJECTION, SOLUTION INTRAVENOUS at 09:01

## 2023-12-29 RX ADMIN — DEXMEDETOMIDINE HYDROCHLORIDE 12 MCG: 100 INJECTION, SOLUTION INTRAVENOUS at 10:04

## 2023-12-29 RX ADMIN — HYDROMORPHONE HYDROCHLORIDE 0.4 MG: 0.2 INJECTION, SOLUTION INTRAMUSCULAR; INTRAVENOUS; SUBCUTANEOUS at 17:28

## 2023-12-29 RX ADMIN — PHENYLEPHRINE HYDROCHLORIDE 200 MCG: 10 INJECTION INTRAVENOUS at 09:17

## 2023-12-29 RX ADMIN — ACETAMINOPHEN 975 MG: 325 TABLET, FILM COATED ORAL at 06:33

## 2023-12-29 RX ADMIN — Medication 20 MG: at 09:08

## 2023-12-29 RX ADMIN — ACETAMINOPHEN 975 MG: 325 TABLET, FILM COATED ORAL at 22:18

## 2023-12-29 RX ADMIN — SUGAMMADEX 200 MG: 100 INJECTION, SOLUTION INTRAVENOUS at 11:22

## 2023-12-29 RX ADMIN — PHENYLEPHRINE HYDROCHLORIDE 200 MCG: 10 INJECTION INTRAVENOUS at 09:32

## 2023-12-29 RX ADMIN — SUCCINYLCHOLINE CHLORIDE 100 MG: 20 INJECTION, SOLUTION INTRAMUSCULAR; INTRAVENOUS; PARENTERAL at 07:50

## 2023-12-29 RX ADMIN — PHENYLEPHRINE HYDROCHLORIDE 200 MCG: 10 INJECTION INTRAVENOUS at 09:34

## 2023-12-29 RX ADMIN — PROPOFOL 20 MG: 10 INJECTION, EMULSION INTRAVENOUS at 09:40

## 2023-12-29 RX ADMIN — FENTANYL CITRATE 50 MCG: 50 INJECTION INTRAMUSCULAR; INTRAVENOUS at 09:42

## 2023-12-29 RX ADMIN — LIDOCAINE HYDROCHLORIDE 100 MG: 20 INJECTION, SOLUTION INFILTRATION; PERINEURAL at 07:51

## 2023-12-29 RX ADMIN — MIDAZOLAM 2 MG: 1 INJECTION INTRAMUSCULAR; INTRAVENOUS at 07:31

## 2023-12-29 RX ADMIN — HYDROMORPHONE HYDROCHLORIDE 0.4 MG: 0.2 INJECTION, SOLUTION INTRAMUSCULAR; INTRAVENOUS; SUBCUTANEOUS at 14:37

## 2023-12-29 RX ADMIN — HYDROMORPHONE HYDROCHLORIDE 0.4 MG: 0.2 INJECTION, SOLUTION INTRAMUSCULAR; INTRAVENOUS; SUBCUTANEOUS at 19:38

## 2023-12-29 RX ADMIN — DEXMEDETOMIDINE HYDROCHLORIDE 12 MCG: 100 INJECTION, SOLUTION INTRAVENOUS at 11:08

## 2023-12-29 RX ADMIN — Medication 50 MG: at 08:00

## 2023-12-29 RX ADMIN — FENTANYL CITRATE 50 MCG: 50 INJECTION INTRAMUSCULAR; INTRAVENOUS at 11:33

## 2023-12-29 RX ADMIN — HYDROMORPHONE HYDROCHLORIDE 0.5 MG: 1 INJECTION, SOLUTION INTRAMUSCULAR; INTRAVENOUS; SUBCUTANEOUS at 10:08

## 2023-12-29 RX ADMIN — HYDROMORPHONE HYDROCHLORIDE 0.4 MG: 0.2 INJECTION, SOLUTION INTRAMUSCULAR; INTRAVENOUS; SUBCUTANEOUS at 13:04

## 2023-12-29 RX ADMIN — ONDANSETRON 4 MG: 2 INJECTION INTRAMUSCULAR; INTRAVENOUS at 11:04

## 2023-12-29 RX ADMIN — CLINDAMYCIN PHOSPHATE 900 MG: 900 INJECTION, SOLUTION INTRAVENOUS at 07:40

## 2023-12-29 RX ADMIN — SODIUM CHLORIDE, POTASSIUM CHLORIDE, SODIUM LACTATE AND CALCIUM CHLORIDE: 600; 310; 30; 20 INJECTION, SOLUTION INTRAVENOUS at 15:50

## 2023-12-29 ASSESSMENT — ACTIVITIES OF DAILY LIVING (ADL)
ADLS_ACUITY_SCORE: 18
ADLS_ACUITY_SCORE: 20
ADLS_ACUITY_SCORE: 18

## 2023-12-29 ASSESSMENT — LIFESTYLE VARIABLES: TOBACCO_USE: 0

## 2023-12-29 ASSESSMENT — ENCOUNTER SYMPTOMS
DYSRHYTHMIAS: 0
SEIZURES: 0

## 2023-12-29 NOTE — ANESTHESIA CARE TRANSFER NOTE
Patient: Saturnino Matthews    Procedure: Procedure(s):  Laparoscopic hiatal herniorrhaphy, Nissen fundoplication       Diagnosis: Hiatal hernia with GERD [K44.9, K21.9]  Diagnosis Additional Information: No value filed.    Anesthesia Type:   No value filed.     Note:    Oropharynx: oropharynx clear of all foreign objects  Level of Consciousness: awake    Level of Supplemental Oxygen (L/min / FiO2): 4  Independent Airway: airway patency satisfactory and stable  Dentition: dentition unchanged  Vital Signs Stable: post-procedure vital signs reviewed and stable  Report to RN Given: handoff report given  Patient transferred to: PACU  Comments: Pt remains stable, monitors on alarms in place, report to PACU RN, no complications  Handoff Report: Identifed the Patient, Identified the Reponsible Provider, Reviewed the pertinent medical history, Discussed the surgical course, Reviewed Intra-OP anesthesia mangement and issues during anesthesia, Set expectations for post-procedure period and Allowed opportunity for questions and acknowledgement of understanding  Vitals:  Vitals Value Taken Time   /67 12/29/23 1134   Temp     Pulse 112 12/29/23 1136   Resp     SpO2 97 % 12/29/23 1136   Vitals shown include unfiled device data.    Electronically Signed By: LUC Costello CRNA  December 29, 2023  11:38 AM

## 2023-12-29 NOTE — PROGRESS NOTES
"  Thoracic Surgery Progress Note  Surgery Cross-Cover  Post Op Check    12/29/2023    Saturnino Matthews is a 23 year old male POD#0 s/p Procedure(s):  Laparoscopic hiatal herniorrhaphy, Nissen fundoplication for Pre-Op Diagnosis Codes:     * Hiatal hernia with GERD [K44.9, K21.9]    Paged for chest pressure and fingler numbness. EKG w/sinus tachy (104), oxygenating at 100% on 1L NC. Chest pain/pressure predominantly right-sided near chest tube site. CXR with small b/l apical pneumothoraces on preliminary read by surgery resident (no glaringly obvious cardiac or pulmonary pathology - will still need formal read). Re-visited patient 1 hour later - doing well.     BP (!) 141/86 (BP Location: Right arm)   Pulse 102   Temp 98.2  F (36.8  C) (Oral)   Resp 16   Ht 1.765 m (5' 9.5\")   Wt 70.3 kg (154 lb 15.7 oz)   SpO2 98%   BMI 22.56 kg/m      Gen: A&O x4, NAD   Chest: breathing non-labored on 1L NC    Abdomen: soft, non-tender, non-distended  Incision: clean, dry, intact covered by dressings  Extremities: warm and well perfused  Devices: right chest tube to water seal    A/P: Continue plan of care per primary team. Please call with any questions.     Matty Hurley MD, MPH  Urology Resident, PGY-1    "

## 2023-12-29 NOTE — OP NOTE
OPERATIVE REPORT    PREOPERATIVE DIAGNOSES:  1.  Gastroesophageal reflux disease.  2.  Hiatal hernia  3.  IEM     POSTOPERATIVE DIAGNOSES:  1.  Gastroesophageal reflux disease.  2.  Hiatal hernia  3.  IEM     OPERATION PERFORMED:    Laparoscopic hiatal herniorrhaphy,   Matthew fundoplication.    SURGEON:  Cam Garner MD    ASSISTANT: Barby Charles MD    ANESTHESIA:  General endotracheal anesthesia.    ESTIMATED BLOOD LOSS:  50 mL.    COMPLICATIONS:  None.    DRAINS: 19 Fr Wayne drain to right pleural space.     SPECIMEN: None     DESCRIPTION OF OPERATION:  The patient was transported to the operating room and placed supine. Following induction of general endotracheal anesthesia, the abdomen was prepped and draped in sterile fashion. We performed a time out confirming the name of the patient and the correct procedure. We placed two 12 mm ports, two finger breaths above the umbilicus on either side of the midline, and two 5 mm ports on the LUQ and RUQ. We entered the abdomen with an open Leif technique, insufflated to 15 mm of mercury and placed in steep reverse trendelenburg. We placed a Deirdre liver retractor.     A small hiatal hernia was present and was easily reduced from the mediastinum.The gastrohepatic ligament was opened and the phrenoesophageal membrane was divided. The esophagus was circumferentially mobilized and we entered into the mediastinum. The esophagus was dissected circumferentially from the level of the diaphragm to the inferior pulmonary veins. A Penrose drain passed around the esophagus for retraction.     We performed an upper endoscopy to confirm adequate intraabdominal esophageal length which was excellent. We then repaired the trish of the diaphragm with O silk pledgeted suture. We placed 4 posterior stitches and one anterior stitch. The trish was repaired over a 52 Fr bougie.      Matthew Fundoplication: The fundus of the stomach was grasped and fikded over the esophagus. We placed 3  2-0 silk sutures from the fundus to the diaphragm, one on the left trish, one at the apex and one at the right trish. Both Vagi were identified and protected throughout the case. The fundoplication was done over a 52 Fr bougie.     Right Pleural drain(s): We made a separate stab incision on the skin and placed a RIGHT/LEFT pleural drain.     We confirmed hemostasis, removed the trocars under direct vision and closed the 12 mm ports with 0 vicryl.     Skin incisions were closed with 3-0 vicryl and steri strips applied. The patient tolerated the procedure well, was extubated in the OR and transferred to PACU. All instruments and sponge counts were correct.     Cam Garner MD

## 2023-12-29 NOTE — ANESTHESIA PREPROCEDURE EVALUATION
Pre-Operative H & P     CC:  Preoperative exam to assess for increased cardiopulmonary risk while undergoing surgery and anesthesia.    Date of Encounter: 12/6/2023  Primary Care Physician:  [facility]     Reason for visit:   No diagnosis found.      NIDIA Matthews is a 23 year old male who presents for pre-operative H & P in preparation for  Procedure Information       Case: [id number] Anesthesia Start Date/Time: 12/29/23 0731    Procedure: Laparoscopic hiatal herniorrhaphy, Nissen fundoplication (Abdomen)    Anesthesia type: General    Diagnosis: Hiatal hernia with GERD [K44.9, K21.9]    Pre-op diagnosis: Hiatal hernia with GERD [K44.9, K21.9]    Location:  OR  /  OR    Providers: Cam Mandel MD          History is obtained from the patient and chart review    Patient who has been recently evaluated by Dr. Jose Wagoner for longstanding issues with heartburn, regurgitation and dysphagia. An EGD on 8/23/23 revealed LA grade A esophagitis, and hiatal hernia. Surgical options were discussed and he was counseled for above procedures.     Patient's history is otherwise significant for allergies and ocular migraines. When younger he had a work up for palpitations and dizziness with no significant findings.       Hx of abnormal bleeding or anti-platelet use: Denies      Past Medical History  Past Medical History:   Diagnosis Date    Allergic reaction     Hiatal hernia with GERD     Ocular migraine        Past Surgical History  Past Surgical History:   Procedure Laterality Date    ENT SURGERY  Adenoids    ESOPHAGOSCOPY, GASTROSCOPY, DUODENOSCOPY (EGD), COMBINED N/A 8/23/2023    Procedure: ESOPHAGOGASTRODUODEOSCOPY WITH BIOPSY;  Surgeon: Naomi Hamilton MD;  Location: [facility] [place] OR    ORTHOPEDIC SURGERY  12/2017    [place] Tooth Extraction Bilateral Lower and Upper.       Prior to Admission Medications  No current outpatient medications on file.       Allergies  Allergies    Allergen Reactions    Gramineae Pollens     Grass     Penicillins     Zithromax [Azithromycin]        Social History  Social History     Socioeconomic History    Marital status: Single     Spouse name: Not on file    Number of children: Not on file    Years of education: Not on file    Highest education level: Not on file   Occupational History    Not on file   Tobacco Use    Smoking status: Never     Passive exposure: Never    Smokeless tobacco: Never    Tobacco comments:     No exposure.   Vaping Use    Vaping Use: Never used   Substance and Sexual Activity    Alcohol use: Yes     Comment: Very occasional    Drug use: Never    Sexual activity: Yes     Partners: Female     Birth control/protection: Condom   Other Topics Concern    Parent/sibling w/ CABG, MI or angioplasty before 65F 55M? No   Social History Narrative    Not on file     Social Determinants of Health     Financial Resource Strain: Not on file   Food Insecurity: Not on file   Transportation Needs: Not on file   Physical Activity: Not on file   Stress: Not on file   Social Connections: Not on file   Interpersonal Safety: Not on file   Housing Stability: Not on file       Family History  Family History   Problem Relation Age of Onset    Hypertension Mother     Colon Cancer Father 48    Asthma Brother         Mild and developed in pre-teens.    Hypertension Maternal Grandmother     Diabetes Maternal Grandmother         Type 2 - Very prevalent on both sides of family    Aneurysm Maternal Grandfather         Brain    Melanoma Paternal Grandfather     Prostate Cancer Paternal Grandfather     Other - See Comments Other         A-fib.    Diabetes Other         My Maternal Uncle has Type 1 diabetes    Anesthesia Reaction No family hx of     Clotting Disorder No family hx of        Review of Systems  The complete review of systems is negative other than noted in the HPI or here.   Anesthesia Evaluation   Pt has had prior anesthetic. Type: General and MAC.     "No history of anesthetic complications       ROS/MED HX  ENT/Pulmonary:    (-) tobacco use and recent URI   Neurologic: Comment: History of ocular migraines    (+)      migraines,                       (-) no seizures   Cardiovascular:     (+)  - -   -  - -                                    Echo: Date: Results:    Stress Test:  Date: Results:    ECG Reviewed:  Date: 6/30/23 Results:  SR  Cath:  Date: Results:   (-) taking anticoagulants/antiplatelets, MERRITT and arrhythmias   METS/Exercise Tolerance: >4 METS    Hematologic:    (-) history of blood clots and history of blood transfusion   Musculoskeletal:  - neg musculoskeletal ROS     GI/Hepatic:     (+) GERD, Symptomatic,    hiatal hernia,              Renal/Genitourinary:  - neg Renal ROS     Endo:  - neg endo ROS     Psychiatric/Substance Use:    (-) psychiatric history   Infectious Disease:  - neg infectious disease ROS     Malignancy:  - neg malignancy ROS     Other:  - neg other ROS          /79 (Cuff Size: Adult Regular)   Temp 36.7  C (98.1  F) (Oral)   Resp 16   Ht 1.765 m (5' 9.5\")   Wt 70.3 kg (154 lb 15.7 oz)   SpO2 98%   BMI 22.56 kg/m      Physical Exam   Constitutional: Awake, alert, cooperative, no apparent distress, and appears stated age.   Eyes: Pupils equal, round and reactive to light, extra ocular muscles intact, sclera clear, conjunctiva normal.  HENT: Normocephalic, oral pharynx with moist mucus membranes, good dentition. No goiter appreciated.   Respiratory: Clear to auscultation bilaterally, no crackles or wheezing. No cough or obvious dyspnea.  Cardiovascular: Regular rate and rhythm, normal S1 and S2, and no murmur noted.  Carotids +2, no bruits. No edema. Palpable pulses to radial  DP and PT arteries.   GI: Normal bowel sounds, soft, non-distended, non-tender, no masses palpated, no hepatosplenomegaly.    Lymph/Hematologic: No cervical lymphadenopathy and no supraclavicular lymphadenopathy.  Genitourinary:  Deferred.   Skin: " Warm and dry.  No rashes at anticipated surgical site.   Musculoskeletal: Full ROM of neck. There is no redness, warmth, or swelling of the joints. Gross motor strength is normal.    Neurologic: Awake, alert, oriented to name, place and time. Cranial nerves II-XII are grossly intact. Gait is normal.   Neuropsychiatric: Calm, cooperative. Normal affect.     Prior Labs/Diagnostic Studies   All labs and imaging personally reviewed   Lab Results   Component Value Date    WBC 5.8 2023    WBC 6.2 2016     Lab Results   Component Value Date    RBC 5.44 2023    RBC 5.42 2016     Lab Results   Component Value Date    HGB 16.3 2023    HGB 16.5 2016     Lab Results   Component Value Date    HCT 47.0 2023    HCT 47.9 2016     Lab Results   Component Value Date    MCV 86 2023    MCV 88 2016     Lab Results   Component Value Date    MCH 30.0 2023    MCH 30.4 2016     Lab Results   Component Value Date    MCHC 34.7 2023    MCHC 34.4 2016     Lab Results   Component Value Date    RDW 12.3 2023    RDW 12.6 2016     Lab Results   Component Value Date     2023     2016     Last Comprehensive Metabolic Panel:  Lab Results   Component Value Date     2023    POTASSIUM 4.1 2023    CHLORIDE 98 2023    CO2 26 2023    ANIONGAP 13 2023    GLC 81 2023    BUN 17.6 2023    CR 0.82 2023    GFRESTIMATED >90 2023    SAWYER 9.9 2023     EK23 Sinus rhythm    23 CT chest                                                                     IMPRESSION: Questionable hiatal hernia formation. No suspicious  pulmonary finding.    10/25/23 MR Brain  INDINGS: No restricted diffusion, gradient signal abnormality or mass is identified. Ventricles are normal in size and configuration. Brain volume is normal. No abnormal parenchymal, leptomeningeal or dural enhancement  is identified.     Minor ethmoid and maxillary sinus disease. Remaining paranasal sinuses and mastoid air cells are clear.     Cervicomedullary junction is normal. Dural sinuses are patent. Intravascular flow voids are maintained at the skull base     7/18/23 Xray esophagram                                                         Impression:   1. No significant barium column at 0 and 1 minute intervals.  2. Barium tablet passed below the gastroesophageal junction  immediately without difficulty.      The patient's records and results personally reviewed by this provider.     Outside records reviewed from: Care Everywhere    LAB/DIAGNOSTIC STUDIES TODAY:  Type and screen    Assessment    Saturnino Matthews is a 23 year old male seen as a PAC referral for risk assessment and optimization for anesthesia.    Plan/Recommendations  Pt will be optimized for the proposed procedure.  See below for details on the assessment, risk, and preoperative recommendations    NEUROLOGY  - No history of TIA, CVA or seizure  - History of ocular migraine. Evaluated by Neurology in past. No specific meds  -Post Op delirium risk factors:  No risk identified    ENT  - No current airway concerns.  Will need to be reassessed day of surgery.  Mallampati: I  TM: > 3    CARDIAC  History of work up for palpitations and dizziness in 2016 with no specific findings. No other cardiac history, symptoms or meds. Good exercise tolerance.  - METS (Metabolic Equivalents)>4    RCRI: 0.9% risk of serious cardiac events    PULMONARY  Allergies. Zyrtec prn but will not take on DOS  Denies asthma, cough or shortness of breath  Low risk for MISSY  - Tobacco History    History   Smoking Status    Never   Smokeless Tobacco    Never       GI: GERD. Occasional swallowing difficulty. Will take omeprazole on DOS  PONV Medium Risk  Total Score: 2           1 AN PONV: Patient is not a current smoker    1 AN PONV: Intended Post Op Opioids        /RENAL  - Baseline  "Creatinine  0.82    ENDOCRINE    - BMI: Estimated body mass index is 22.56 kg/m  as calculated from the following:    Height as of this encounter: 1.765 m (5' 9.5\").    Weight as of this encounter: 70.3 kg (154 lb 15.7 oz).  Healthy Weight (BMI 18.5-24.9)  - No history of Diabetes Mellitus    HEME  VTE Low Risk 0.26%            Total Score: 0      Denies personal or family history of blood clots  Denies history of blood transfusion     Different anesthesia methods/types have been discussed with the patient, but they are aware that the final plan will be decided by the assigned anesthesia provider on the date of service.      The patient is optimized for their procedure. AVS with information on surgery time/arrival time, meds and NPO status given by nursing staff. No further diagnostic testing indicated.      On the day of service:     Prep time: 10 minutes  Visit time: 14 minutes  Documentation time: 12 minutes  ------------------------------------------  Total time: 36 minutes      LUC Francis CNS  Preoperative Assessment Center  [facility]  Phone: [phone]  Fax: [phone]    Physical Exam    Airway        Mallampati: I   TM distance: > 3 FB   Neck ROM: full   Mouth opening: > 3 cm    Respiratory Devices and Support         Dental       (+) Minor Abnormalities - some fillings, tiny chips      Cardiovascular   cardiovascular exam normal          Pulmonary   pulmonary exam normal                Anesthesia Plan    ASA Status:  2    NPO Status:  ELEVATED Aspiration Risk/Unknown    Anesthesia Type: General.     - Airway: ETT   Induction: Intravenous, Propofol, RSI.   Maintenance: Balanced.   Techniques and Equipment:     - Lines/Monitors: 2nd IV     Consents    Anesthesia Plan(s) and associated risks, benefits, and realistic alternatives discussed. Questions answered and patient/representative(s) expressed understanding.     - Discussed:     - Discussed " with:  Patient      - Extended Intubation/Ventilatory Support Discussed: No.      - Patient is DNR/DNI Status: No     Use of blood products discussed: Yes.     - Discussed with: Patient.     - Consented: consented to blood products     Postoperative Care    Pain management: IV analgesics, Oral pain medications, Multi-modal analgesia.   PONV prophylaxis: Ondansetron (or other 5HT-3), Dexamethasone or Solumedrol     Comments:

## 2023-12-29 NOTE — PROVIDER NOTIFICATION
7B 225  Saturnino Matthews  pt is reporting numbness and tingling to R last 3 fingers and left amalia. also reporting tightness to chest, chest tube has no air leak or crepitus. please see patient thank you.   Manuela [phone]      Responds: patient seen at bedside by provider. Order placed for chest xray and EKG.

## 2023-12-29 NOTE — BRIEF OP NOTE
[facility]    Brief Operative Note    Pre-operative diagnosis: Hiatal hernia with GERD [K44.9, K21.9]  Post-operative diagnosis Same as pre-operative diagnosis    Procedure: Laparoscopic hiatal herniorrhaphy, Nissen fundoplication, N/A - Abdomen    Surgeon: Surgeon(s) and Role:     * Cam Mandel MD - Primary     * Barby Dunn MD - Resident - Assisting     * Jj Fraser MD - Fellow - Assisting  Anesthesia: General   Estimated Blood Loss: 15cc    Drains: Right pleural tube to water seal  Specimens: * No specimens in log *  Findings:   None.  Complications: None.  Implants: * No implants in log *      CXR in PACU  \Admit to thoracic surgery  Chest tube to   NPO, sky for ice chips    Barby Dunn MD  Surgery PGY-3

## 2023-12-29 NOTE — ANESTHESIA PROCEDURE NOTES
Airway       Patient location during procedure: OR       Procedure Start/Stop Times: 12/29/2023 7:53 AM  Staff -        Performed By: CRNAIndications and Patient Condition       Indications for airway management: jas-procedural       Induction type:RSI      Final Airway Details       Final airway type: endotracheal airway       Successful airway: ETT - single  Endotracheal Airway Details        ETT size (mm): 7.5       Cuffed: yes       Successful intubation technique: direct laryngoscopy       DL Blade Type: Watson 2       Grade View of Cords: 1       Adjucts: stylet    Post intubation assessment        Placement verified by: capnometry        Number of attempts at approach: 1       Ease of procedure: easy    Medication(s) Administered   Medication Administration Time: 12/29/2023 7:53 AM

## 2023-12-29 NOTE — ANESTHESIA POSTPROCEDURE EVALUATION
Patient: Saturnino Matthews    Procedure: Procedure(s):  Laparoscopic hiatal herniorrhaphy, Nissen fundoplication       Anesthesia Type:  No value filed.    Note:  Disposition: Inpatient   Postop Pain Control: Uneventful            Sign Out: Well controlled pain   PONV: No   Neuro/Psych: Uneventful            Sign Out: Acceptable/Baseline neuro status   Airway/Respiratory: Uneventful            Sign Out: Acceptable/Baseline resp. status   CV/Hemodynamics: Uneventful            Sign Out: Acceptable CV status; No obvious hypovolemia; No obvious fluid overload   Other NRE: NONE   DID A NON-ROUTINE EVENT OCCUR? No           Last vitals:  Vitals Value Taken Time   /75 12/29/23 1230   Temp 36.8  C (98.2  F) 12/29/23 1200   Pulse 94 12/29/23 1230   Resp 20 12/29/23 1200   SpO2 98 % 12/29/23 1200   Vitals shown include unfiled device data.    Electronically Signed By: Quinn Parker MD  December 29, 2023  12:31 PM

## 2023-12-30 ENCOUNTER — APPOINTMENT (OUTPATIENT)
Dept: GENERAL RADIOLOGY | Facility: CLINIC | Age: 23
End: 2023-12-30
Payer: COMMERCIAL

## 2023-12-30 ENCOUNTER — APPOINTMENT (OUTPATIENT)
Dept: OCCUPATIONAL THERAPY | Facility: CLINIC | Age: 23
End: 2023-12-30
Payer: COMMERCIAL

## 2023-12-30 LAB
ANION GAP SERPL CALCULATED.3IONS-SCNC: 12 MMOL/L (ref 7–15)
BUN SERPL-MCNC: 9 MG/DL (ref 6–20)
CALCIUM SERPL-MCNC: 8.9 MG/DL (ref 8.6–10)
CHLORIDE SERPL-SCNC: 101 MMOL/L (ref 98–107)
CREAT SERPL-MCNC: 0.74 MG/DL (ref 0.67–1.17)
DEPRECATED HCO3 PLAS-SCNC: 22 MMOL/L (ref 22–29)
EGFRCR SERPLBLD CKD-EPI 2021: >90 ML/MIN/1.73M2
ERYTHROCYTE [DISTWIDTH] IN BLOOD BY AUTOMATED COUNT: 13.3 % (ref 10–15)
GLUCOSE BLDC GLUCOMTR-MCNC: 75 MG/DL (ref 70–99)
GLUCOSE BLDC GLUCOMTR-MCNC: 79 MG/DL (ref 70–99)
GLUCOSE SERPL-MCNC: 91 MG/DL (ref 70–99)
HCT VFR BLD AUTO: 43.2 % (ref 40–53)
HGB BLD-MCNC: 14.8 G/DL (ref 13.3–17.7)
MAGNESIUM SERPL-MCNC: 1.8 MG/DL (ref 1.7–2.3)
MCH RBC QN AUTO: 30.9 PG (ref 26.5–33)
MCHC RBC AUTO-ENTMCNC: 34.3 G/DL (ref 31.5–36.5)
MCV RBC AUTO: 90 FL (ref 78–100)
PLATELET # BLD AUTO: 192 10E3/UL (ref 150–450)
POTASSIUM SERPL-SCNC: 4.3 MMOL/L (ref 3.4–5.3)
RBC # BLD AUTO: 4.79 10E6/UL (ref 4.4–5.9)
SODIUM SERPL-SCNC: 135 MMOL/L (ref 135–145)
WBC # BLD AUTO: 11.5 10E3/UL (ref 4–11)

## 2023-12-30 PROCEDURE — 71045 X-RAY EXAM CHEST 1 VIEW: CPT

## 2023-12-30 PROCEDURE — 97165 OT EVAL LOW COMPLEX 30 MIN: CPT | Mod: GO

## 2023-12-30 PROCEDURE — 258N000003 HC RX IP 258 OP 636

## 2023-12-30 PROCEDURE — 71045 X-RAY EXAM CHEST 1 VIEW: CPT | Mod: 76

## 2023-12-30 PROCEDURE — 97530 THERAPEUTIC ACTIVITIES: CPT | Mod: GO

## 2023-12-30 PROCEDURE — 999N000147 HC STATISTIC PT IP EVAL DEFER: Performed by: REHABILITATION PRACTITIONER

## 2023-12-30 PROCEDURE — 85027 COMPLETE CBC AUTOMATED: CPT

## 2023-12-30 PROCEDURE — 71045 X-RAY EXAM CHEST 1 VIEW: CPT | Mod: 26 | Performed by: RADIOLOGY

## 2023-12-30 PROCEDURE — 120N000002 HC R&B MED SURG/OB UMMC

## 2023-12-30 PROCEDURE — 250N000013 HC RX MED GY IP 250 OP 250 PS 637

## 2023-12-30 PROCEDURE — 250N000011 HC RX IP 250 OP 636

## 2023-12-30 PROCEDURE — 80048 BASIC METABOLIC PNL TOTAL CA: CPT

## 2023-12-30 PROCEDURE — 36415 COLL VENOUS BLD VENIPUNCTURE: CPT

## 2023-12-30 PROCEDURE — 83735 ASSAY OF MAGNESIUM: CPT

## 2023-12-30 RX ADMIN — METHOCARBAMOL 750 MG: 750 TABLET ORAL at 07:12

## 2023-12-30 RX ADMIN — OXYCODONE HYDROCHLORIDE 10 MG: 10 TABLET ORAL at 07:59

## 2023-12-30 RX ADMIN — OXYCODONE HYDROCHLORIDE 5 MG: 5 TABLET ORAL at 16:04

## 2023-12-30 RX ADMIN — SODIUM CHLORIDE, POTASSIUM CHLORIDE, SODIUM LACTATE AND CALCIUM CHLORIDE: 600; 310; 30; 20 INJECTION, SOLUTION INTRAVENOUS at 03:31

## 2023-12-30 RX ADMIN — OXYCODONE HYDROCHLORIDE 10 MG: 10 TABLET ORAL at 21:50

## 2023-12-30 RX ADMIN — METHOCARBAMOL 750 MG: 750 TABLET ORAL at 19:36

## 2023-12-30 RX ADMIN — ACETAMINOPHEN 975 MG: 325 TABLET, FILM COATED ORAL at 16:04

## 2023-12-30 RX ADMIN — HYDROMORPHONE HYDROCHLORIDE 0.4 MG: 0.2 INJECTION, SOLUTION INTRAMUSCULAR; INTRAVENOUS; SUBCUTANEOUS at 04:23

## 2023-12-30 RX ADMIN — METHOCARBAMOL 750 MG: 750 TABLET ORAL at 13:24

## 2023-12-30 RX ADMIN — ACETAMINOPHEN 975 MG: 325 TABLET, FILM COATED ORAL at 07:12

## 2023-12-30 RX ADMIN — HYDROMORPHONE HYDROCHLORIDE 0.4 MG: 0.2 INJECTION, SOLUTION INTRAMUSCULAR; INTRAVENOUS; SUBCUTANEOUS at 00:01

## 2023-12-30 RX ADMIN — ENOXAPARIN SODIUM 40 MG: 40 INJECTION SUBCUTANEOUS at 10:33

## 2023-12-30 RX ADMIN — SENNOSIDES AND DOCUSATE SODIUM 1 TABLET: 50; 8.6 TABLET ORAL at 21:50

## 2023-12-30 RX ADMIN — ACETAMINOPHEN 975 MG: 325 TABLET, FILM COATED ORAL at 21:50

## 2023-12-30 RX ADMIN — SENNOSIDES AND DOCUSATE SODIUM 1 TABLET: 50; 8.6 TABLET ORAL at 07:59

## 2023-12-30 RX ADMIN — OXYCODONE HYDROCHLORIDE 10 MG: 10 TABLET ORAL at 12:15

## 2023-12-30 RX ADMIN — POLYETHYLENE GLYCOL 3350 17 G: 17 POWDER, FOR SOLUTION ORAL at 07:59

## 2023-12-30 RX ADMIN — SODIUM CHLORIDE, POTASSIUM CHLORIDE, SODIUM LACTATE AND CALCIUM CHLORIDE: 600; 310; 30; 20 INJECTION, SOLUTION INTRAVENOUS at 16:13

## 2023-12-30 RX ADMIN — IBUPROFEN 600 MG: 600 TABLET, FILM COATED ORAL at 03:29

## 2023-12-30 RX ADMIN — OXYCODONE HYDROCHLORIDE 10 MG: 10 TABLET ORAL at 03:28

## 2023-12-30 RX ADMIN — HYDROMORPHONE HYDROCHLORIDE 0.4 MG: 0.2 INJECTION, SOLUTION INTRAMUSCULAR; INTRAVENOUS; SUBCUTANEOUS at 23:00

## 2023-12-30 ASSESSMENT — ACTIVITIES OF DAILY LIVING (ADL)
ADLS_ACUITY_SCORE: 20
PREVIOUS_RESPONSIBILITIES: MEAL PREP;HOUSEKEEPING;LAUNDRY;DRIVING;WORK
ADLS_ACUITY_SCORE: 20

## 2023-12-30 NOTE — PLAN OF CARE
"Goal Outcome Evaluation:      Plan of Care Reviewed With: patient    Overall Patient Progress: no changeOverall Patient Progress: no change  Neuro: patient is A/Ox4 able to make needs known  Respiratory: Lungs clear >95%. Denies SOB.  Cardiac: WDL ex tachycardiac with 's. Denies cardiac chest pain.   Diet: NPO with ice chips  GI/: hypoactive BS, no BM or flatus this shift. Voiding without difficulties.   Incision/Drains: upper abdominal lap sites x4 and right upper chest tube site. Chest tube to water seal without air leak  and had 10 ml out since put in.  IV Access: , L PIV infusing MIVF LR at 75ml/hr.R PIV.  Pain: reports presssure pain to chest tube site and incision. Pain is managed with PRN dilaudid, oxycodone, robaxin and scheduled tylenol.   BP (!) 142/89 (BP Location: Right arm, Cuff Size: Adult Large)   Pulse 92   Temp 98.2  F (36.8  C) (Oral)   Resp 18   Ht 1.765 m (5' 9.5\")   Wt 70.3 kg (154 lb 15.7 oz)   SpO2 95%   BMI 22.56 kg/m   RA.  Activity: up with assist x1 to bathroom. Moving well.  : Reports numbness and tingling to fingers is getting better and MD aware.  Plan: continue to manage pain. Continue POC.            "

## 2023-12-30 NOTE — PLAN OF CARE
"Goal Outcome Evaluation:      Plan of Care Reviewed With: patient, parent, sibling    Overall Patient Progress: improving      Neuro: patient is A/Ox4 able to make needs known  Respiratory: wean oxygen down to room air saturation >95%. Denies SOB.  Cardiac: WDL ex tachycardiac with 's. Denies cardiac chest pain.   Diet: NPO with ice chips  GI/: hypoactive BS, no BM or flatus this shift. Voiding without difficulties.   Incision/Drains: upper abdominal lap sites x4 and right upper chest tube site. Chest tube to water seal without air leak or crepitus.   IV Access: L/R PIV, L PIV infusing MIVF at 75ml/hr.  Pain: reports presssure pain to chest tube site and incision. Pain is managed with PRN dilaudid, oxycodone, robaxin and scheduled tylenol.   Labs: reviewed  VS: /86 (BP Location: Right arm)   Pulse 102   Temp 98.1  F (36.7  C) (Oral)   Resp 16   Ht 1.765 m (5' 9.5\")   Wt 70.3 kg (154 lb 15.7 oz)   SpO2 98%   BMI 22.56 kg/m     Activity: up with assist x1 to bathroom.   New Changes this shift: reports pressure pain and numbness and tingling to fingers. MD made aware patient seen at bedside labs ordered.   Plan: continue to manage pain. Continue POC.       Admitted/transferred from: PACU  2 RN full   skin assessment completed by Manuela Hui RN and Fany CHU RN .  Skin assessment finding: issues found abdominal lap site x4 and chest tube insertion site, PIV L/R.     Interventions/actions: other continue to monitor incision      Bedside Emergency Equipment Present:  Suction Regulator: Yes  Suction Canister: Yes  Tubing between Regulator and Canister: Yes  O2 Regulator with Tree: Yes  Ambu Bag: Yes          "

## 2023-12-30 NOTE — PROGRESS NOTES
Thoracic Surgery Progress Note  12/30/2023      S: No acute events overnight. Tolerating ice chips. Pain controlled. No other concerns.     O: Temp: 98  F (36.7  C) Temp src: Oral BP: 131/85 Pulse: 89   Resp: 17 SpO2: 98 % O2 Device: None (Room air) Oxygen Delivery: 3 LPM    Exam:  General: awake, interactive, NAD  Resp: breathing comfortably on room air, no respiratory distress  CV: RR, appears well perfused  Extremities: moving all extremities  Neuro: A&O, cranial nerves grossly intact  Psych: appropriate affect    R pleural tube: 10cc//0cc, no air leak    Labs reviewed    Imaging: CXR stable      A/P: 24yo man with hiatal hernia s/p Laparoscopic repair and Matthew fundoplication on 12/29 with Dr. Garcia.     - Multimodal pain control  - Chest tube removed, post pull CXR ordered  - Okay for sips of clears and ice chips today  - OT recommends home with assist, dispo pending pain control and diet advancement     Seen and discussed with staff surgeon.      Barby Dunn MD  Surgery PGY-3

## 2023-12-30 NOTE — PHARMACY-ADMISSION MEDICATION HISTORY
Pharmacy Intern Admission Medication History    Admission medication history is complete. The information provided in this note is only as accurate as the sources available at the time of the update.    Information Source(s): Patient and CareEverywhere/SureScripts via in-person    Pertinent Information: Patient was an accurate historian of his medications. Unable to confirm the strength of coenzyme Q-10.     Changes made to PTA medication list:  Added:   COENZYME Q-10 PO  Deleted: None  Changed: None    Allergies reviewed with patient and updates made in EHR: yes    Medication History Completed By: Nishant Yanes 12/29/2023 7:00 PM    PTA Med List   Medication Sig Last Dose    cetirizine (ZYRTEC) 10 MG tablet Take 1 tablet by mouth daily as needed for allergies More than a month    COENZYME Q-10 PO Take 1 capsule by mouth daily Past Month    multivitamin w/minerals (MULTI-VITAMIN) tablet Take 1 tablet by mouth daily Past Month    omeprazole 20 MG tablet Take 20 mg by mouth every morning 12/28/2023 at am    riboflavin 400 MG CAPS Take 400 mg by mouth daily Past Week

## 2023-12-30 NOTE — PROGRESS NOTES
Physical Therapy: Orders received. Chart reviewed and discussed with care team.? Physical Therapy not indicated due to patient is mobilizing well, no deficits in balance, no skilled need for PT at this time.? Defer discharge recommendations to OT.? Will complete orders.

## 2023-12-30 NOTE — PROGRESS NOTES
"   12/30/23 0900   Appointment Info   Signing Clinician's Name / Credentials (OT) Nettie Watts OTR/L   Rehab Comments (OT) abd precs   Living Environment   People in Home parent(s);sibling(s)  (29 and 32 yr old brothers)   Current Living Arrangements house   Home Accessibility stairs to enter home;stairs within home   Number of Stairs, Main Entrance 1   Number of Stairs, Within Home, Primary other (see comments)  (split level, flight up to main living area and flight down to basement)   Stair Railings, Within Home, Primary railings safe and in good condition   Transportation Anticipated family or friend will provide   Living Environment Comments Pt lives in split level home w/ mother and 2 adult brothers. Tub shower on upper level, walk in shower in basement.   Self-Care   Usual Activity Tolerance excellent   Current Activity Tolerance moderate   Equipment Currently Used at Home none   Fall history within last six months no   Activity/Exercise/Self-Care Comment Pt previously IND w/ all ADLs. Very active at baseline, enjoys hiking, spending time with friends.   Instrumental Activities of Daily Living (IADL)   Previous Responsibilities meal prep;housekeeping;laundry;driving;work   IADL Comments Works as certified opthalmic technician;   General Information   Onset of Illness/Injury or Date of Surgery 12/29/23   Referring Physician Barby Dunn MD   Patient/Family Therapy Goal Statement (OT) to return home   Additional Occupational Profile Info/Pertinent History of Current Problem Per chart: \"Saturnino Matthews is a 23 year old male POD#0 s/p Laparoscopic hiatal herniorrhaphy, Nissen fundoplication for Hiatal hernia with GERD\"   Existing Precautions/Restrictions abdominal   Left Upper Extremity (Weight-bearing Status) partial weight-bearing (PWB)  (<10 lb)   Right Upper Extremity (Weight-bearing Status) partial weight-bearing (PWB)  (<10lb)   Left Lower Extremity (Weight-bearing Status) full weight-bearing (FWB) "   Right Lower Extremity (Weight-bearing Status) full weight-bearing (FWB)   General Observations and Info Activity: ambulate with assist   Cognitive Status Examination   Orientation Status orientation to person, place and time   Affect/Mental Status (Cognitive) WNL   Follows Commands WNL   Cognitive Status Comments Pt is A&Ox45, pleasant, cognition appears WNL.   Visual Perception   Visual Impairment/Limitations WNL   Sensory   Sensory Comments Reports previous n/t in R hand, resolved with changing IV to L hand.   Pain Assessment   Patient Currently in Pain Yes, see Vital Sign flowsheet   Posture   Posture forward head position   Range of Motion Comprehensive   General Range of Motion no range of motion deficits identified   Strength Comprehensive (MMT)   General Manual Muscle Testing (MMT) Assessment no strength deficits identified   Coordination   Upper Extremity Coordination No deficits were identified   Bed Mobility   Comment (Bed Mobility) SBA w/ HOB raised and use of bed rail, cues for log roll   Transfers   Transfer Comments SBA STS from EOB to IV pole   Balance   Balance Assessment standing dynamic balance   Balance Comments SBA w/ IV pole   Activities of Daily Living   BADL Assessment/Intervention bathing;lower body dressing;grooming;toileting   Bathing Assessment/Intervention   Comment, (Bathing) Per clinical judgement, SBA   Lower Body Dressing Assessment/Training   Comment, (Lower Body Dressing) Per clinical judgement, SBA w/ figure 4   Grooming Assessment/Training   Comment, (Grooming) Per clinical judgement, SBA standing at sink   Toileting   Comment, (Toileting) Per clinical judgement, SBA   Clinical Impression   Criteria for Skilled Therapeutic Interventions Met (OT) Yes, treatment indicated   OT Diagnosis Decreased IND w/ ADL and mobility   Influenced by the following impairments post op pain, precautions, reduced activity tolerance   OT Problem List-Impairments impacting ADL problems related  to;activity tolerance impaired;pain;post-surgical precautions   Assessment of Occupational Performance 1-3 Performance Deficits   Identified Performance Deficits bathing, dressing, toileting   Planned Therapy Interventions (OT) ADL retraining;transfer training;home program guidelines;progressive activity/exercise   Clinical Decision Making Complexity (OT) problem focused assessment/low complexity   Risk & Benefits of therapy have been explained evaluation/treatment results reviewed;care plan/treatment goals reviewed;risks/benefits reviewed;current/potential barriers reviewed;participants voiced agreement with care plan;participants included;patient   Clinical Impression Comments Pt mobilizing well POD1, anticipate pt will be safe to return home w/ assist once medically stable   OT Total Evaluation Time   OT Eval, Low Complexity Minutes (85187) 8   OT Goals   Therapy Frequency (OT) Daily   OT Predicted Duration/Target Date for Goal Attainment 01/13/24   OT Goals Hygiene/Grooming;Lower Body Dressing;Bed Mobility;Transfers;Toilet Transfer/Toileting;OT Goal 1   OT: Hygiene/Grooming independent;within precautions;while standing   OT: Lower Body Dressing Independent;within precautions;including set-up/clothing retrieval   OT: Bed Mobility Modified independent;within precautions   OT: Transfer Modified independent;within precautions  (tub transfer)   OT: Toilet Transfer/Toileting Modified independent;toilet transfer;cleaning and garment management;within precautions   OT: Goal 1 Pt will ascend/descend 8 stairs w/ handrail as needed to simulate home env prior to d/c.   Therapeutic Activities   Therapeutic Activity Minutes (64671) 26   Symptoms noted during/after treatment increased pain   Treatment Detail/Skilled Intervention Pt greeted in supine, agreeable to therapy session. OT eval completed and treatment initiated. Th educated in abd precautions and provided handout, educating in BLT acronym for memory aid. Pt  verbalized understanding. Th cued bed mobility w/ log roll, pt demo'ing understanding, side lying>seated EOB w/ HOB raised and bed rail. Th educating in use of pillows at home to aid in flat bed mob. Pt reporting 5/10 pain at rest. Th reviewing LB dressing w/ figure 4 within precautions. STS from EOB to IV pole and ambulated into hallway SBA. Ambulated ~150 total w/ BUE support on IV pole for comfort, slow and controlled pace.  Upon return to room, pt mother present, reviewed abd precs and answering questions about activity upon return home. Pt declining additional ADL completion at this time, request return to supine. Th encouraging ambulation in halls 3-4x/day, pt agreeable. RN updated pt safe to ambulate IND'ly.   OT Discharge Planning   OT Plan stairs, tub transfer, LB dressing   OT Discharge Recommendation (DC Rec) home with assist   OT Rationale for DC Rec Pt below baseline, limited by post op pain and precautions. Pt mobilizing well, SBA w/ IV pole in hallways. Anticipate pt will be safe to return home with assist for IADLs from mother and adult brothers as needed.   OT Brief overview of current status SBA w/ IV pole   Total Session Time   Timed Code Treatment Minutes 26   Total Session Time (sum of timed and untimed services) 34

## 2023-12-31 ENCOUNTER — APPOINTMENT (OUTPATIENT)
Dept: OCCUPATIONAL THERAPY | Facility: CLINIC | Age: 23
End: 2023-12-31
Attending: THORACIC SURGERY (CARDIOTHORACIC VASCULAR SURGERY)
Payer: COMMERCIAL

## 2023-12-31 ENCOUNTER — APPOINTMENT (OUTPATIENT)
Dept: GENERAL RADIOLOGY | Facility: CLINIC | Age: 23
End: 2023-12-31
Payer: COMMERCIAL

## 2023-12-31 VITALS
SYSTOLIC BLOOD PRESSURE: 125 MMHG | TEMPERATURE: 98 F | DIASTOLIC BLOOD PRESSURE: 84 MMHG | WEIGHT: 154.98 LBS | RESPIRATION RATE: 18 BRPM | HEART RATE: 85 BPM | BODY MASS INDEX: 22.19 KG/M2 | OXYGEN SATURATION: 99 % | HEIGHT: 70 IN

## 2023-12-31 LAB
ANION GAP SERPL CALCULATED.3IONS-SCNC: 11 MMOL/L (ref 7–15)
BUN SERPL-MCNC: 10.1 MG/DL (ref 6–20)
CALCIUM SERPL-MCNC: 8.8 MG/DL (ref 8.6–10)
CHLORIDE SERPL-SCNC: 99 MMOL/L (ref 98–107)
CREAT SERPL-MCNC: 0.71 MG/DL (ref 0.67–1.17)
DEPRECATED HCO3 PLAS-SCNC: 21 MMOL/L (ref 22–29)
EGFRCR SERPLBLD CKD-EPI 2021: >90 ML/MIN/1.73M2
ERYTHROCYTE [DISTWIDTH] IN BLOOD BY AUTOMATED COUNT: 13.3 % (ref 10–15)
GLUCOSE SERPL-MCNC: 76 MG/DL (ref 70–99)
HCT VFR BLD AUTO: 45.4 % (ref 40–53)
HGB BLD-MCNC: 15.3 G/DL (ref 13.3–17.7)
MCH RBC QN AUTO: 30.1 PG (ref 26.5–33)
MCHC RBC AUTO-ENTMCNC: 33.7 G/DL (ref 31.5–36.5)
MCV RBC AUTO: 89 FL (ref 78–100)
PLATELET # BLD AUTO: 177 10E3/UL (ref 150–450)
POTASSIUM SERPL-SCNC: 4.5 MMOL/L (ref 3.4–5.3)
RBC # BLD AUTO: 5.09 10E6/UL (ref 4.4–5.9)
SODIUM SERPL-SCNC: 131 MMOL/L (ref 135–145)
WBC # BLD AUTO: 8.7 10E3/UL (ref 4–11)

## 2023-12-31 PROCEDURE — 80048 BASIC METABOLIC PNL TOTAL CA: CPT

## 2023-12-31 PROCEDURE — 36415 COLL VENOUS BLD VENIPUNCTURE: CPT

## 2023-12-31 PROCEDURE — 250N000013 HC RX MED GY IP 250 OP 250 PS 637

## 2023-12-31 PROCEDURE — 97530 THERAPEUTIC ACTIVITIES: CPT | Mod: GO

## 2023-12-31 PROCEDURE — 85027 COMPLETE CBC AUTOMATED: CPT

## 2023-12-31 PROCEDURE — 71045 X-RAY EXAM CHEST 1 VIEW: CPT

## 2023-12-31 PROCEDURE — 71045 X-RAY EXAM CHEST 1 VIEW: CPT | Mod: 26 | Performed by: STUDENT IN AN ORGANIZED HEALTH CARE EDUCATION/TRAINING PROGRAM

## 2023-12-31 PROCEDURE — 250N000011 HC RX IP 250 OP 636

## 2023-12-31 PROCEDURE — 97535 SELF CARE MNGMENT TRAINING: CPT | Mod: GO

## 2023-12-31 PROCEDURE — 258N000003 HC RX IP 258 OP 636

## 2023-12-31 RX ORDER — ACETAMINOPHEN 325 MG/1
975 TABLET ORAL EVERY 8 HOURS
Qty: 250 TABLET | Refills: 0 | Status: SHIPPED | OUTPATIENT
Start: 2023-12-31 | End: 2024-01-22

## 2023-12-31 RX ORDER — OXYCODONE HYDROCHLORIDE 5 MG/1
5 TABLET ORAL EVERY 4 HOURS PRN
Qty: 40 TABLET | Refills: 0 | Status: SHIPPED | OUTPATIENT
Start: 2023-12-31 | End: 2023-12-31

## 2023-12-31 RX ORDER — IBUPROFEN 600 MG/1
600 TABLET, FILM COATED ORAL EVERY 6 HOURS PRN
COMMUNITY
Start: 2023-12-31 | End: 2024-01-22

## 2023-12-31 RX ORDER — METHOCARBAMOL 750 MG/1
750 TABLET, FILM COATED ORAL EVERY 6 HOURS PRN
Qty: 60 TABLET | Refills: 0 | Status: SHIPPED | OUTPATIENT
Start: 2023-12-31 | End: 2024-01-22

## 2023-12-31 RX ORDER — POLYETHYLENE GLYCOL 3350 17 G/17G
17 POWDER, FOR SOLUTION ORAL DAILY
Qty: 510 G | Refills: 0 | Status: SHIPPED | OUTPATIENT
Start: 2023-12-31 | End: 2024-01-22

## 2023-12-31 RX ORDER — OXYCODONE HYDROCHLORIDE 5 MG/1
5 TABLET ORAL EVERY 4 HOURS PRN
Qty: 40 TABLET | Refills: 0 | Status: SHIPPED | OUTPATIENT
Start: 2023-12-31 | End: 2024-01-22

## 2023-12-31 RX ADMIN — ENOXAPARIN SODIUM 40 MG: 40 INJECTION SUBCUTANEOUS at 08:50

## 2023-12-31 RX ADMIN — OXYCODONE HYDROCHLORIDE 5 MG: 5 TABLET ORAL at 12:03

## 2023-12-31 RX ADMIN — SODIUM CHLORIDE, POTASSIUM CHLORIDE, SODIUM LACTATE AND CALCIUM CHLORIDE: 600; 310; 30; 20 INJECTION, SOLUTION INTRAVENOUS at 05:29

## 2023-12-31 RX ADMIN — OXYCODONE HYDROCHLORIDE 5 MG: 5 TABLET ORAL at 08:50

## 2023-12-31 RX ADMIN — ACETAMINOPHEN 975 MG: 325 TABLET, FILM COATED ORAL at 15:16

## 2023-12-31 RX ADMIN — SENNOSIDES AND DOCUSATE SODIUM 1 TABLET: 50; 8.6 TABLET ORAL at 08:50

## 2023-12-31 RX ADMIN — METHOCARBAMOL 750 MG: 750 TABLET ORAL at 12:03

## 2023-12-31 RX ADMIN — ACETAMINOPHEN 975 MG: 325 TABLET, FILM COATED ORAL at 05:30

## 2023-12-31 RX ADMIN — POLYETHYLENE GLYCOL 3350 17 G: 17 POWDER, FOR SOLUTION ORAL at 08:50

## 2023-12-31 RX ADMIN — OXYCODONE HYDROCHLORIDE 10 MG: 10 TABLET ORAL at 03:20

## 2023-12-31 RX ADMIN — METHOCARBAMOL 750 MG: 750 TABLET ORAL at 05:29

## 2023-12-31 ASSESSMENT — ACTIVITIES OF DAILY LIVING (ADL)
ADLS_ACUITY_SCORE: 18
ADLS_ACUITY_SCORE: 18
ADLS_ACUITY_SCORE: 20
ADLS_ACUITY_SCORE: 20
ADLS_ACUITY_SCORE: 18

## 2023-12-31 NOTE — PLAN OF CARE
"Goal Outcome Evaluation:Goal Outcome Evaluation:    Neuro: patient is A/Ox4 able to make needs known  Respiratory: on room air saturation >95%. Denies SOB.  Cardiac: WDL. Denies cardiac chest pain.   Diet: NPO with ice chips and sips of clears  GI/: hypoactive BS, no BM or flatus this shift. Voiding without difficulties.   Incision/Drains: upper abdominal lap sites x4 and right upper chest tube site dressings CDI.   IV Access: L/R PIV, L PIV infusing MIVF at 75ml/hr.  Pain: reports pain to incisions. Pain is managed with PRN dilaudid, oxycodone, robaxin and scheduled tylenol. Cold pack applied aswell.  Labs: reviewed  VS: BP (!) 136/90 (BP Location: Right arm)   Pulse 88   Temp 98.1  F (36.7  C) (Oral)   Resp 18   Ht 1.765 m (5' 9.5\")   Wt 70.3 kg (154 lb 15.7 oz)   SpO2 96%   BMI 22.56 kg/m     Activity: up with assist stand by to bathroom and hallway.   New Changes this shift: chest tube removed.   Plan: continue to manage pain. Continue POC.             "

## 2023-12-31 NOTE — PROGRESS NOTES
CLINICAL NUTRITION SERVICES    Reason for Assessment:  Nutrition education regarding diet s/p nissen or similar surgery    Diet History:  Pt admits to not receiving nutrition education on diet advancement and stages of diet advancement s/p nissen or similar surgery.    Nutrition Diagnosis:  Food- and nutrition-related knowledge deficit r/t no previous knowledge of anticipated diet s/p nissen or similar procedure AEB pt report of not receiving nutrition education on s/p nissen or similar surgery in the past.      Interventions:  Nutrition Education  1.  Provided verbal instruction on diet s/p nissen fundoplication or related surgery.  Discussed foods/beverages at each stage of the diet (clear liquids, full liquids, semi-solid, and regular foods) and anticipated diet.  Also, reviewed helpful hints when starting solid food and ways of dealing with potential side effects post-op.    2.  Provided handout:  Diet Guidelines for a Nissen Fundoplication       Goals:   1.  Patient will verbalize at least two foods or beverages at each stage of the diet.    2.  Pt to verbalize anticipated duration of each diet stage.      Follow-up:    Patient to ask any further nutrition-related questions before discharge.  In addition, pt may request outpatient RD appointment.    Abiola Torres, MS, RD, LD, CCTD, CNSC  7A and 7B beds [phone], pager [phone]  Weekend pager [phone]

## 2023-12-31 NOTE — PROGRESS NOTES
Patient discharged home following hospital stay for: Hiatal Hernia nissen fuda    Vitals stable.  Oxygen status: on room air  Patient tolerating diet: Full liquid    Belongings sent with patient. IV site discontinued. Discharge meds to discharge pharmacy. AVS and education gone over with patient. Pt to follow up as outpatient and with PCP. Pt verbalized understanding of all education and information.

## 2023-12-31 NOTE — DISCHARGE SUMMARY
NAME: Saturnino Matthews   MRN: [id number]   : 2000     DATE OF ADMISSION: 2023     PRE/POSTOPERATIVE DIAGNOSES: Hiatal hernia    PROCEDURES PERFORMED: 2023 laparoscopic hiatal herniorrhaphy, Matthew fundoplication    PATHOLOGY RESULTS: N/A     CULTURE RESULTS: None     INTRAOPERATIVE COMPLICATIONS: None     POSTOPERATIVE MEDICAL ISSUES: None     DRAINS/TUBES PRESENT AT DISCHARGE: sutures (absorbable)    DATE OF DISCHARGE:  23    HOSPITAL COURSE: Saturnino Matthews is a 23 year old male who on 2023  underwent the above-named procedures.  He tolerated the operation well and postoperatively was transferred to the general post-surgical unit.  The remainder of his course was essentially uncomplicated.  His chest tube was removed on POD1. Prior to discharge, his pain was controlled well, he was able to perform ADLs and ambulate independently without difficulty, and had full return of bowel and bladder function.  On , he was discharged to home in stable condition.     DISCHARGE EXAM:   A&O, NAD  Resp non-labored  Distal extremities warm    Incisions c/d/I with steri strips     DISCHARGE INSTRUCTIONS:  Discharge Procedure Orders   X-ray UGI   Standing Status: Future Standing Exp. Date: 24     Order Specific Question Answer Comments   Priority Routine      Thoracic Surgery  Referral   Standing Status: Future   Referral Priority: Routine: Next available opening Referral Type: Consultation   Requested Specialty: Cardiovascular & Thoracic Surgery   Number of Visits Requested: 1     Brief Discharge Instructions     Reason for your hospital stay   Order Comments: Hiatal hernia repair     Activity   Order Comments: Your activity upon discharge: activity as tolerated     Order Specific Question Answer Comments   Is discharge order? Yes      Diet   Order Comments: Follow this diet upon discharge: Full liquids, no carbonated beverages for 2 weeks     Order Specific Question Answer  Comments   Is discharge order? Yes        DISCHARGE MEDICATIONS:   Current Discharge Medication List        START taking these medications    Details   acetaminophen (TYLENOL) 325 MG tablet Take 3 tablets (975 mg) by mouth every 8 hours  Qty: 250 tablet, Refills: 0    Associated Diagnoses: Hiatal hernia      ibuprofen (ADVIL/MOTRIN) 600 MG tablet Take 1 tablet (600 mg) by mouth every 6 hours as needed for inflammatory pain    Associated Diagnoses: Hiatal hernia      methocarbamol (ROBAXIN) 750 MG tablet Take 1 tablet (750 mg) by mouth every 6 hours as needed for muscle spasms  Qty: 60 tablet, Refills: 0    Associated Diagnoses: Hiatal hernia      oxyCODONE (ROXICODONE) 5 MG tablet Take 1 tablet (5 mg) by mouth every 4 hours as needed for moderate pain  Qty: 40 tablet, Refills: 0    Associated Diagnoses: Hiatal hernia      polyethylene glycol (MIRALAX) 17 GM/Dose powder Take 17 g by mouth daily  Qty: 510 g, Refills: 0    Associated Diagnoses: Hiatal hernia           CONTINUE these medications which have NOT CHANGED    Details   cetirizine (ZYRTEC) 10 MG tablet Take 1 tablet by mouth daily as needed for allergies      COENZYME Q-10 PO Take 1 capsule by mouth daily      multivitamin w/minerals (MULTI-VITAMIN) tablet Take 1 tablet by mouth daily      omeprazole 20 MG tablet Take 20 mg by mouth every morning      riboflavin 400 MG CAPS Take 400 mg by mouth daily

## 2023-12-31 NOTE — PROGRESS NOTES
Thoracic Surgery Progress Note  12/31/2023      S: No acute events overnight. Tolerating sips of clears. Pain controlled with current regimen.      O: Temp: 98  F (36.7  C) Temp src: Oral BP: 109/66 Pulse: 85   Resp: 18 SpO2: 99 % O2 Device: None (Room air)      Exam:  General: awake, interactive, NAD  Resp: breathing comfortably on room air, no respiratory distress  CV: RR, appears well perfused  Extremities: moving all extremities  Neuro: A&O, cranial nerves grossly intact  Psych: appropriate affect    Labs reviewed  Na 131  K 4.5  WBC 8.7  Hgb 15.3    Imaging: CXR decreased bilateral pneumos      A/P: 24yo man with hiatal hernia s/p Laparoscopic repair and Matthew fundoplication on 12/29 with Dr. Garcia.     - Multimodal pain control, wean IV dilaudid  - Okay for CLD  - OT recommends home with assist, dispo pending pain control and diet advancement     Seen with fellow, will discuss with staff surgeon     Barby Dunn MD  Surgery PGY-3

## 2023-12-31 NOTE — DISCHARGE INSTRUCTIONS
THORACIC SURGERY DISCHARGE INSTRUCTIONS    DIET: liquid diet at time of discharge.  Follow post Nissen diet recommendations as discussed with dietician and detailed in provided handout    Take stool softeners as prescribed at discharge (Miralax, docusate, and/or senna).  CALL THE THORACIC SURGERY TEAM IF YOU HAVE NOT HAD A BOWEL MOVEMENT BY 48HRS AFTER DISCHARGE.     Given your recent procedure to correct or minimize your reflux, you should no longer need to take your anti-acid medication regularly.      If your plans upon discharge include prolonged periods of sitting (i.e a lengthy car or plane ride), it is highly beneficial to get up and walk at least once per hour to help prevent swelling and blood clots.     You may remove chest tube dressing 48 hours after tube removal and bandage the site at your own discretion thereafter.  Small amounts of leakage are normal for 2-3 days after removal.  Feel free to call with questions.    You may get incision wet 2 days after operation. Do not submerge, soak, or scrub incision or swim until seen in follow-up.    Take incentive spirometer home for continued frequent use    Activity as tolerated, no strenous activity until seen in follow-up, no lifting greater than 10 pounds for the next 8-10 weeks.    Call for fever greater than 101.5, chills, increased size of incision, red skin around incision, vision changes, muscle strength changes, sensation changes, shortness of breath, or other concerns.    No driving while taking narcotic pain medication.    Transition to ibuprofen or tylenol/acetaminophen for pain control. Do not take tylenol/acetaminophen and acetaminophen containing narcotic (e.g., percocet or vicodin) at the same time. If you have known ulcer problems, or kidney trouble (elevated creatinine) do not take the ibuprofen.    In emergencies, call 911    For other Questions or Concerns;   A.) During weekday working hours (Monday through Friday 8am to 4:30pm)   call  [phone] and ask to speak to a clinical nurse specialist.     B.) At nights (after 4:30pm), on weekends, or if urgent call [phone] and   tell the   I would like to page job code [phone], the thoracic surgery   fellow on call, please.

## 2023-12-31 NOTE — PLAN OF CARE
Status: S/p Laparoscopic hiatal herniorrhaphy, Nissen fundoplication on 12/29.  Vitals: VSS on RA.  Neuros: A&Ox4, calls appropriately  IV: PIV x2. 1 infusing LR @ 75 mL/hr.  Resp/trach: WNL  Diet: NPO ex ice chips  Bowel status: LBM PTA  : Voiding spontaneously  Skin: upper abdominal lap sites x4 and R upper chest tube site.   Pain: Incisional pain managed with PRN dilaudid, oxycodone, robaxin, and schedule Tylenol.   Activity: SBA  Plan: Continue with pain management and current POC.

## 2024-01-03 ENCOUNTER — PATIENT OUTREACH (OUTPATIENT)
Dept: SURGERY | Facility: CLINIC | Age: 24
End: 2024-01-03
Payer: COMMERCIAL

## 2024-01-03 ENCOUNTER — NURSE TRIAGE (OUTPATIENT)
Dept: ONCOLOGY | Facility: CLINIC | Age: 24
End: 2024-01-03
Payer: COMMERCIAL

## 2024-01-03 ENCOUNTER — NURSE TRIAGE (OUTPATIENT)
Dept: NURSING | Facility: CLINIC | Age: 24
End: 2024-01-03
Payer: COMMERCIAL

## 2024-01-03 ENCOUNTER — PATIENT OUTREACH (OUTPATIENT)
Dept: CARE COORDINATION | Facility: CLINIC | Age: 24
End: 2024-01-03
Payer: COMMERCIAL

## 2024-01-03 LAB
ATRIAL RATE - MUSE: 104 BPM
DIASTOLIC BLOOD PRESSURE - MUSE: NORMAL MMHG
INTERPRETATION ECG - MUSE: NORMAL
P AXIS - MUSE: 51 DEGREES
PR INTERVAL - MUSE: 180 MS
QRS DURATION - MUSE: 100 MS
QT - MUSE: 342 MS
QTC - MUSE: 449 MS
R AXIS - MUSE: 80 DEGREES
SYSTOLIC BLOOD PRESSURE - MUSE: NORMAL MMHG
T AXIS - MUSE: 53 DEGREES
VENTRICULAR RATE- MUSE: 104 BPM

## 2024-01-03 NOTE — TELEPHONE ENCOUNTER
Reason for Disposition   Nursing judgment    Protocols used: Information Only Call - No Triage-A-OH  Nurse Triage SBAR    Is this a 2nd Level Triage? No    Situation: Protocol Recommended Disposition: Pt had thoracic surgery on 12/29/2023 laparoscopic hiatal herniorrhaphy, Matthew fundoplication     CHW states pt is having some tingling in his neck. He has 5-6 questions post op procedure.     Discuss With PCP And Callback By Nurse Within 1 Hour    Recommendation: Transfer to Thoracic surgery RN to triage.   She agreed.

## 2024-01-03 NOTE — TELEPHONE ENCOUNTER
"Post Op Discharge Call    Surgery: Laparoscopic hiatal herniorrhaphy, Matthew fundoplication     Surgery date: 12/29/2023    Discharge Date:  12/31/2023    Immediate Concerns: None at this time.     Pain:  No concerns with pain management. Reports that pain is currently 1-2/10. Describes as a \"pressure or ache at the incision sites\". Reports that he stopped taking the Oxycodone and Robaxin after being home for 1 day and switched to using Tylenol and Ibuprofen. Reports that he is currently only taking Ibuprofen for pain.     Incision:   No concerns, healing well, no redness, drainage or edema reported.   Dressing changes per discharge. No changes in wound status.   Lap sites with Steri Strips and open to air. Patient reports that he changed his Chest Tube Site Dressing yesterday.   Inquired if should change the dressing located at the base of his Left Rib Cage. Instructed him to change the dressing and maintain a clean dry dressing for the next 5-7 days.   Reviewed with patient that he can shower. Incisions can get wet. No scrubbing, let warm soapy water run over the incisions, pat dry and apply clean dry dressing.     Drains:   No drain.     Diet:   Liquid diet, tolerating well, no concerns with oral intake.  Reports that he is currently tolerating a Full Liquid diet. Denies nausea or any difficulties swallowing. Reports that he does feel bloated at times.  Reviewed with patient that he can begin to advance his diet to \"pureed to soft diet\". Reviewed examples of soft foods he can begin incorporating into his diet. Reviewed which foods to avoid (tough meats, raw vegetables, breads, nuts, popcorn).    Bowels:   Patient reports that he is having daily formed bowel movements. Was taking Miralax daily but stopped after having loose stools on Monday.     Activity:   No difficulty with ADLs reported.   Patient is up independently at home.  Reports some MERRITT that does resolve quickly. Denies SOB at rest.   Reports that he is " using his Incentive Spirometer.    Post op/follow up plans:   Post op appointment scheduled,confirmed date and time with patient.   Explained that the Esophagram scheduled for 1/26/2024 is not needed and will be canceled. Confirmed that patient's [facility] paperwork was completed by Dr Garcia while in the Hospital.   Reviewed post-operative restrictions with patient. Patient verbalized his understanding of No pushing, pulling, or lifting more that 10 pounds and No bending or twisting for at least the first 2 weeks. Can gradually increase activity after 2 weeks.      Future Appointments   Date Time Provider Department Center   1/22/2024  2:00 PM [address]   1/22/2024  3:00 PM Kavitha Ndiaye APRN [facility]   1/26/2024  8:00 AM [address]       Patient no further questions/concerns at thistime.   Patient has our direct number for any questions or concerns that may arise.    Zoë Noble RN, BSN  Thoracic Surgery RN Care Coordinator

## 2024-01-03 NOTE — TELEPHONE ENCOUNTER
Oncology Nurse Triage - Reporting Symptoms  Situation:   Saturnino calling with incision questions    Background:   Treating Provider: Dr. Garcia    Date of last office visit: 12/29/23    Recent treatments: Yes: 12/29/23   Laparoscopic hiatal herniorrhaphy, Nissen fundoplication (Abdomen)     Assessment  Steri strips on 3 incisions- intact  4x4 under rib cage- gauze with white clear bandage over top  Chest tube in R- pt has changed this dressing  Denies redness, yellow/green discharge, fever.   Pt concerned about in hospital having low O2 readings and chest tightness, wondering if there is any concerns at home for recovery? Pt denies SOB, bluing of fingers.     Pt wondering which incisions have glue and which have stitches? Writer reviewed 3 small incisions with steri strips have glue.     Wondering if he should change the dressing under the rib cage?     Recommendations:   Writer advised if he experiences any SOB, he should go to ED. Writer advised to leave steri strips in place until they fall off, informed will send message to care team about rib dressing and someone will call him back. Pt voiced understanding.   Per Zoë, RNCC, she will follow up with pt directly.

## 2024-01-03 NOTE — PROGRESS NOTES
"CHW offered Clinic Care Coordination to an established [facility] eligible patient and patient declined CCC at this time.     Clinic Care Coordination Contact  [facility]: Post-Discharge Note  SITUATION                                                      Admission:    Admission Date: 12/29/23   Reason for Admission: Hiatal hernia  Discharge:   Discharge Date: 12/31/23  Discharge Diagnosis: Hiatal hernia, PROCEDURES PERFORMED: 12/29/2023 laparoscopic hiatal herniorrhaphy, Matthew fundoplication    BACKGROUND                                                      Per hospital discharge summary and inpatient provider notes:    [facility]  Saturnino Matthews is a 23 year old male who presents for pre-operative H & P in preparation for  Procedure Information         Case: [id number] Anesthesia Start Date/Time: 12/29/23 0731     Procedure: Laparoscopic hiatal herniorrhaphy, Nissen fundoplication (Abdomen)     Anesthesia type: General     Diagnosis: Hiatal hernia with GERD [K44.9, K21.9]     Pre-op diagnosis: Hiatal hernia with GERD [K44.9, K21.9]     [facility]: [facility]  /  OR     History is obtained from the patient and chart review     Patient who has been recently evaluated by Dr. Jose Wagoner for longstanding issues with heartburn, regurgitation and dysphagia. An EGD on 8/23/23 revealed LA grade A esophagitis, and hiatal hernia. Surgical options were discussed and he was counseled for above procedures.      Patient's history is otherwise significant for allergies and ocular migraines. When younger he had a work up for palpitations and dizziness with no significant findings.     ASSESSMENT      Discharge Assessment  How are you doing now that you are home?: \"I was waiting for your call I am doing a lot better...as expected. Some chest tightness occasionally ...some pressure on the incisions site areas but getting better and better. Since Jan 1st my first day back, the night prior and I stopped taking the oxycodone and the " methocarbamol. Just been taking the ibuprofen and acetaminophen and then stopped taking the acetaminophen...I noticed more chest pressure when I took them together but that resolved in a few minutes. I tested that yesterday morning and the acetampinophen cuased some fizzing in my stomach or some bubbles..kind of like gas...I had some burping and some constiant pressure...so just taking the4 ibuprofen now.  How are your symptoms? (Red Flag symptoms escalate to triage hotline per guidelines): Improved  Do you feel your condition is stable enough to be safe at home until your provider visit?: Yes  Does the patient have their discharge instructions? : Yes  Does the patient have questions regarding their discharge instructions? : Yes (see comment) (CHW dialed in Nurse Triage to talk with pt, Nurse Triage recommended pt talk with Surgeon Team/Speciality providers.)  Were you started on any new medications or were there changes to any of your previous medications? : Yes  Does the patient have all of their medications?: Yes  Do you have questions regarding any of your medications? : No  Do you have all of your needed medical supplies or equipment (DME)?  (i.e. oxygen tank, CPAP, cane, etc.): Yes  Discharge follow-up appointment scheduled within 14 calendar days? : Yes  Discharge Follow Up Appointment Date: 01/22/24  Discharge Follow Up Appointment Scheduled with?: Specialty Care Provider (Thoracic Surgery appt.)    Post-op (CHW CTA Only)  If the patient had a surgery or procedure, do they have any questions for a nurse?: Yes (see comment) (Pt had 6-7 questions for an RN. CHW dialed in Nurse Triage to talk with pt.)      PLAN                                                      Outpatient Plan:      Thoracic Surgery  Referral  Please be aware that coverage of these services is subject to the terms and limitations of your health insurance plan. Call member services at your health plan with any benefit or coverage  questions.    [facility] will call you to coordinate your care as prescribed by the provider. If you don t hear from a representative within 2 business days, please call [phone].    Cardiovascular & Thoracic Surgery  Scheduling Instructions: [facility] will call you to coordinate your care as prescribed by the provider. If you don t hear from a representative within 2 business days, please call [phone].  Additional Information: 1 month follow up s/p lap HH repair 12/29 with      Future Appointments   Date Time Provider Department Center   1/22/2024  2:00 PM [address]   1/22/2024  3:00 PM Kavitha Ndiaye APRN [facility]   1/26/2024  8:00 AM [address]         For any urgent concerns, please contact our 24 hour nurse triage line: [phone] ([phone])         BRIDGETT Nieto  [phone]  Connected Care Resource [facility]

## 2024-01-12 ENCOUNTER — PATIENT OUTREACH (OUTPATIENT)
Dept: SURGERY | Facility: CLINIC | Age: 24
End: 2024-01-12
Payer: COMMERCIAL

## 2024-01-12 NOTE — PROGRESS NOTES
"ADDENDUM:  Extension of return to work date approved by LUC Ndiaye. Updated Return to Work letter provided to patient via streamit.   Patient updated via streamit.   The following recommendations from LUC Ndiaye included in AFARt message to patient. Avoid drink fluids while eating. Spacing out small frequent meals. Pay attention to what causes you to feel bloated. When you feel bloated get up and walk around to get things moving.  Can take Beano if you know that you are going to eat something that makes you feel bloated. Encourages patient try to eat more soft foods versus pureeing everything.       [facility]: Thoracic Surgery                                                                                 Called patient to follow up on voicemail message and streamit message received from patient requesting a return to work note. Patient had hernia repair surgery on 12/29/2023. Has not been seen for his 1-month follow up visit. Scheduled for 1/22/2024.    Called and spoke with patient. Patient reports that his [employer] paperwork has him returning to work on January 15, 2024. 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 Patient reports that he is still struggling with his appetite and intake. Verbalized that he is still blending/pureeing most foods because he feels bloated when taking in more solid foods. Reports that he drinks water when he is eating to assist with food going down. Inquired if patient is experiencing sensation of food getting stuck. Patient did answer the question. Verbalized that it is \"easier to drink the pureed food at this time\".    Reviewed with patient that he should be introducing more \"soft foods\" at this time. Reviewed what he can eat and what foods to avoid. Instructed patient to not drink any water/fluids when eating as this is likely the cause of the bloated feeling. Reviewed that he should be eating small " frequent high protein foods. Patient verbalized his understanding, however sounded hesitant to writer.    Patient verbalized that with his struggles with food, he is not feeling ready to return to work at this time.  Informed writer that we can move his return to work date to Jan 23 so it is after his 1 month follow up appointment with our Thoracic Surgery Advanced Practice Nurse. Patient verbalized that he would like that. Writer will update LUC Ndiaye on patient status and will update patient on their response.    Patient appreciated writer's call and assistance.    Direct message sent to LUC Ndiaye.    Zoë Noble RN, BSN  Thoracic Surgery RN Care Coordinator

## 2024-01-22 ENCOUNTER — ONCOLOGY VISIT (OUTPATIENT)
Dept: SURGERY | Facility: CLINIC | Age: 24
End: 2024-01-22
Attending: THORACIC SURGERY (CARDIOTHORACIC VASCULAR SURGERY)
Payer: COMMERCIAL

## 2024-01-22 ENCOUNTER — ANCILLARY PROCEDURE (OUTPATIENT)
Dept: GENERAL RADIOLOGY | Facility: CLINIC | Age: 24
End: 2024-01-22
Attending: CLINICAL NURSE SPECIALIST
Payer: COMMERCIAL

## 2024-01-22 VITALS
RESPIRATION RATE: 12 BRPM | OXYGEN SATURATION: 97 % | HEART RATE: 105 BPM | WEIGHT: 148.5 LBS | SYSTOLIC BLOOD PRESSURE: 117 MMHG | DIASTOLIC BLOOD PRESSURE: 77 MMHG | BODY MASS INDEX: 21.62 KG/M2 | TEMPERATURE: 98.1 F

## 2024-01-22 DIAGNOSIS — K44.9 HIATAL HERNIA WITH GERD: ICD-10-CM

## 2024-01-22 DIAGNOSIS — K21.9 HIATAL HERNIA WITH GERD: ICD-10-CM

## 2024-01-22 DIAGNOSIS — K21.9 GASTROESOPHAGEAL REFLUX DISEASE, UNSPECIFIED WHETHER ESOPHAGITIS PRESENT: Primary | ICD-10-CM

## 2024-01-22 PROCEDURE — 99212 OFFICE O/P EST SF 10 MIN: CPT | Performed by: CLINICAL NURSE SPECIALIST

## 2024-01-22 PROCEDURE — 74220 X-RAY XM ESOPHAGUS 1CNTRST: CPT | Mod: GC | Performed by: STUDENT IN AN ORGANIZED HEALTH CARE EDUCATION/TRAINING PROGRAM

## 2024-01-22 PROCEDURE — 99024 POSTOP FOLLOW-UP VISIT: CPT | Performed by: CLINICAL NURSE SPECIALIST

## 2024-01-22 ASSESSMENT — PAIN SCALES - GENERAL: PAINLEVEL: NO PAIN (0)

## 2024-01-22 NOTE — NURSING NOTE
"Oncology Rooming Note    January 22, 2024 3:01 PM   Saturnino Matthews is a 23 year old male who presents for:    No chief complaint on file.    Initial Vitals: /77 (BP Location: Right arm, Patient Position: Sitting, Cuff Size: Adult Regular)   Pulse 105   Temp 98.1  F (36.7  C) (Oral)   Resp 12   Wt 67.4 kg (148 lb 8 oz)   SpO2 97%   BMI 21.62 kg/m   Estimated body mass index is 21.62 kg/m  as calculated from the following:    Height as of 12/29/23: 1.765 m (5' 9.5\").    Weight as of this encounter: 67.4 kg (148 lb 8 oz). Body surface area is 1.82 meters squared.  No Pain (0) Comment: Data Unavailable   No LMP for male patient.  Allergies reviewed: Yes  Medications reviewed: Yes    Medications: Medication refills not needed today.  Pharmacy name entered into [employer]:    [id number] - [place], [place] - [address] [place] [address] [place] AT [address] & [id number] [place], [place] - [address] AT [address] [id number]  [place] - [address] AT [facility] access [address] & [place]    Frailty Screening:   Is the patient here for a new oncology consult visit in cancer care? 2. No      Clinical concerns:  NONE      Patricia Steen              "

## 2024-01-22 NOTE — LETTER
1/22/2024         RE: Saturnino Matthews  [address] [place] [facility] [address]        Dear Colleague,    Thank you for referring your patient, Saturnino Matthews, to the [facility]. Please see a copy of my visit note below.    THORACIC SURGERY FOLLOW UP VISIT      I saw Mr. Saturnino Matthews in follow-up today. The clinical summary follows:     PREOP DIAGNOSIS   1.  Gastroesophageal reflux disease.  2.  Hiatal hernia  3.  IEM   PROCEDURE   Laparoscopic hiatal herniorrhaphy,   Matthew fundoplication.  DATE OF PROCEDURE  12/29/2023    COMPLICATIONS  None    INTERVAL STUDIES  Esophagram: final report pending at time of clinic visit. To my review, the contrast does appear to move into the stomach through the area of the wrap. There is some delay with a large bolus of contrast however. The barium tablet passes through the esophagus and into the stomach quite easily.    Past Medical History:   Diagnosis Date    Allergic reaction     Hiatal hernia with GERD     Ocular migraine       Past Surgical History:   Procedure Laterality Date    ENT SURGERY  Adenoids    ESOPHAGOSCOPY, GASTROSCOPY, DUODENOSCOPY (EGD), COMBINED N/A 8/23/2023    Procedure: ESOPHAGOGASTRODUODEOSCOPY WITH BIOPSY;  Surgeon: Naomi Hamilton MD;  [place] [facility] [place]    LAPAROSCOPIC HERNIORRHAPHY HIATAL N/A 12/29/2023    Procedure: Laparoscopic hiatal herniorrhaphy, Nissen fundoplication;  Surgeon: Cam Mandel MD;  [facility]    ORTHOPEDIC SURGERY  12/2017    [place] Tooth Extraction Bilateral Lower and Upper.      Social History     Socioeconomic History    Marital status: Single     Spouse name: Not on file    Number of children: Not on file    Years of education: Not on file    Highest education level: Not on file   Occupational History    Not on file   Tobacco Use    Smoking status: Never     Passive exposure: Never    Smokeless tobacco: Never    Tobacco comments:     No exposure.   Vaping Use    Vaping  Use: Never used   Substance and Sexual Activity    Alcohol use: Yes     Comment: Very occasional    Drug use: Never    Sexual activity: Yes     Partners: Female     Birth control/protection: Condom   Other Topics Concern    Parent/sibling w/ CABG, MI or angioplasty before 65F 55M? No   Social History Narrative    Not on file     Social Determinants of Health     Financial Resource Strain: Not on file   Food Insecurity: Not on file   Transportation Needs: Not on file   Physical Activity: Not on file   Stress: Not on file   Social Connections: Not on file   Interpersonal Safety: Not on file   Housing Stability: Not on file      SUBJECTIVE  Jorge L is doing well. He had been having a lot of bloating however, he was also drinking a lot of water with his meals. He spoke with our RNCC, Zoë and she advised he stop doing this. Once he did, the bloating improved. For the most part he has not noticed any foods getting stuck., He did have some salmon that got stuck once but it passed through after a minute or two. He has not had and reflux which he is very happy about. He is wondering if he is able to start exercising again. He does use weights but usually 30# dumbbells with high reps.    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    IMPRESSION   23 year-old male status post laparoscopic hiatal hernia repair and Matthew fundoplication. He is here for post operative follow up.    He can continue to advance his diet. I expressed importance on small bites, chewing well and taking his time while eating. He can resume exercising.    PLAN  I spent 15 min on the date of the encounter in chart review, patient visit,  review of tests, documentation and/or discussion with other providers about the issues documented above. I reviewed the plan as follows:  Follow up with me in 1 year with esophagram prior  Necessary Tests & Appointments: esophagram   All questions were answered and the patient and present family were in agreement with the plan.  I appreciate the opportunity to participate in the care of your patient and will keep you updated.  Sincerely,    LUC Modi CNS

## 2024-01-22 NOTE — PROGRESS NOTES
THORACIC SURGERY FOLLOW UP VISIT      I saw Mr. Saturnino Matthews in follow-up today. The clinical summary follows:     PREOP DIAGNOSIS   1.  Gastroesophageal reflux disease.  2.  Hiatal hernia  3.  IEM   PROCEDURE   Laparoscopic hiatal herniorrhaphy,   Matthew fundoplication.  DATE OF PROCEDURE  12/29/2023    COMPLICATIONS  None    INTERVAL STUDIES  Esophagram: final report pending at time of clinic visit. To my review, the contrast does appear to move into the stomach through the area of the wrap. There is some delay with a large bolus of contrast however. The barium tablet passes through the esophagus and into the stomach quite easily.    Past Medical History:   Diagnosis Date    Allergic reaction     Hiatal hernia with GERD     Ocular migraine       Past Surgical History:   Procedure Laterality Date    ENT SURGERY  Adenoids    ESOPHAGOSCOPY, GASTROSCOPY, DUODENOSCOPY (EGD), COMBINED N/A 8/23/2023    Procedure: ESOPHAGOGASTRODUODEOSCOPY WITH BIOPSY;  Surgeon: Naomi Hamilton MD;  [place] [facility] [place]    LAPAROSCOPIC HERNIORRHAPHY HIATAL N/A 12/29/2023    Procedure: Laparoscopic hiatal herniorrhaphy, Nissen fundoplication;  Surgeon: Cam Mandel MD;  [facility]    ORTHOPEDIC SURGERY  12/2017    [place] Tooth Extraction Bilateral Lower and Upper.      Social History     Socioeconomic History    Marital status: Single     Spouse name: Not on file    Number of children: Not on file    Years of education: Not on file    Highest education level: Not on file   Occupational History    Not on file   Tobacco Use    Smoking status: Never     Passive exposure: Never    Smokeless tobacco: Never    Tobacco comments:     No exposure.   Vaping Use    Vaping Use: Never used   Substance and Sexual Activity    Alcohol use: Yes     Comment: Very occasional    Drug use: Never    Sexual activity: Yes     Partners: Female     Birth control/protection: Condom   Other Topics Concern    Parent/sibling w/ CABG, MI or  angioplasty before 65F 55M? No   Social History Narrative    Not on file     Social Determinants of Health     Financial Resource Strain: Not on file   Food Insecurity: Not on file   Transportation Needs: Not on file   Physical Activity: Not on file   Stress: Not on file   Social Connections: Not on file   Interpersonal Safety: Not on file   Housing Stability: Not on file      SUBJECTIVE  Jorge L is doing well. He had been having a lot of bloating however, he was also drinking a lot of water with his meals. He spoke with our RNCC, Zoë and she advised he stop doing this. Once he did, the bloating improved. For the most part he has not noticed any foods getting stuck., He did have some salmon that got stuck once but it passed through after a minute or two. He has not had and reflux which he is very happy about. He is wondering if he is able to start exercising again. He does use weights but usually 30# dumbbells with high reps.    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    IMPRESSION   23 year-old male status post laparoscopic hiatal hernia repair and Matthew fundoplication. He is here for post operative follow up.    He can continue to advance his diet. I expressed importance on small bites, chewing well and taking his time while eating. He can resume exercising.    PLAN  I spent 15 min on the date of the encounter in chart review, patient visit, review of tests, documentation and/or discussion with other providers about the issues documented above. I reviewed the plan as follows:  Follow up with me in 1 year with esophagram prior  Necessary Tests & Appointments: esophagram   All questions were  answered and the patient and present family were in agreement with the plan.  I appreciate the opportunity to participate in the care of your patient and will keep you updated.  Sincerely,

## 2024-01-23 ENCOUNTER — DOCUMENTATION ONLY (OUTPATIENT)
Dept: SURGERY | Facility: CLINIC | Age: 24
End: 2024-01-23
Payer: COMMERCIAL

## 2024-01-23 NOTE — PROGRESS NOTES
[employer] for Duty Certification form completed and Return to work note faxed to [employer] at [phone].    Zoë Noble RN, BSN  Thoracic Surgery RN Care Coordinator

## 2024-03-10 ENCOUNTER — E-VISIT (OUTPATIENT)
Dept: FAMILY MEDICINE | Facility: CLINIC | Age: 24
End: 2024-03-10
Payer: COMMERCIAL

## 2024-03-10 DIAGNOSIS — L64.9 MALE PATTERN ALOPECIA: Primary | ICD-10-CM

## 2024-03-10 PROCEDURE — 99421 OL DIG E/M SVC 5-10 MIN: CPT | Mod: 24 | Performed by: PHYSICIAN ASSISTANT

## 2024-03-11 RX ORDER — FINASTERIDE 1 MG/1
1 TABLET, FILM COATED ORAL DAILY
Qty: 90 TABLET | Refills: 1 | Status: SHIPPED | OUTPATIENT
Start: 2024-03-11 | End: 2024-09-07

## 2024-03-11 NOTE — TELEPHONE ENCOUNTER
Provider E-Visit time total (minutes): 8    Trial of finasteride for hair loss.     Follow up in 6 month for recheck.     Already using modafinil with inadequate results.     Mike Santos PA-C

## 2024-03-12 ENCOUNTER — TELEPHONE (OUTPATIENT)
Dept: FAMILY MEDICINE | Facility: CLINIC | Age: 24
End: 2024-03-12
Payer: COMMERCIAL

## 2024-03-12 NOTE — TELEPHONE ENCOUNTER
Please contact patient.     Insurance does not cover finasteride.     This would be out of pocket.     Please contact pharmacy for cost.     Mike Santos PA-C

## 2024-03-12 NOTE — TELEPHONE ENCOUNTER
Retail Pharmacy Prior Authorization Team   Phone: [phone]    PRIOR AUTHORIZATION DENIED    Medication: FINASTERIDE 1 MG PO TABS  Insurance Company: [employer] - Phone [phone] Fax [phone]  Denial Date: 3/11/2024  Denial Reason(s): DRUG NOT COVERED FOR CONDITION PROVIDED       Appeal Information: IF PROVIDER WOULD LIKE TO APPEAL THIS DECISION PLEASE PROVIDE THE PA TEAM WITH A LETTER OF MEDICAL NECESSITY      Patient Notified: No

## 2024-03-12 NOTE — TELEPHONE ENCOUNTER
Left message for pt with below information 03/12/2024  Thank you,  Cynthia HERCULES    [phone]

## 2024-06-05 ENCOUNTER — DOCUMENTATION ONLY (OUTPATIENT)
Dept: FAMILY MEDICINE | Facility: CLINIC | Age: 24
End: 2024-06-05
Payer: COMMERCIAL

## 2024-06-05 DIAGNOSIS — Z01.84 IMMUNITY STATUS TESTING: Primary | ICD-10-CM

## 2024-06-05 NOTE — PROGRESS NOTES
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 Hep B & MMR titers. Quantitative results only.     There is no order available. Please review and place either future orders or HMPO (Review of Health Maintenance Protocol Orders), as appropriate.    Health Maintenance Due   Topic    ANNUAL REVIEW OF HM ORDERS      Violeta Richardson, CMA

## 2024-09-07 ENCOUNTER — HEALTH MAINTENANCE LETTER (OUTPATIENT)
Age: 24
End: 2024-09-07

## (undated) DEVICE — PREP CHLORAPREP 26ML TINTED HI-LITE ORANGE 930815

## (undated) DEVICE — TUBING SMOKE EVAC PNEUMOCLEAR HIGH FLOW 0620050250

## (undated) DEVICE — DRSG STERI STRIP 1/2X4" R1547

## (undated) DEVICE — GLOVE BIOGEL PI MICRO SZ 7.5 48575

## (undated) DEVICE — GOWN IMPERVIOUS 2XL BLUE

## (undated) DEVICE — TUBING SUCTION MEDI-VAC 1/4"X20' N620A

## (undated) DEVICE — JELLY LUBRICATING SURGILUBE 2OZ TUBE

## (undated) DEVICE — SU VICRYL 4-0 PS-2 18" UND J496H

## (undated) DEVICE — SUCTION CATH AIRLIFE TRI-FLO W/CONTROL PORT 14FR  T60C

## (undated) DEVICE — SU SILK 0 SH 30" K834H

## (undated) DEVICE — ENDO TROCAR FIRST ENTRY KII FIOS Z-THRD 12X100MM CTF73

## (undated) DEVICE — ESU ELEC BLADE 2.75" COATED/INSULATED E1455

## (undated) DEVICE — GLOVE EXAM NITRILE LG PF LATEX FREE 5064

## (undated) DEVICE — ADH LIQUID MASTISOL TOPICAL VIAL 2-3ML 0523-48

## (undated) DEVICE — ESU PENCIL SMOKE EVAC W/ROCKER SWITCH 0703-047-000

## (undated) DEVICE — SUCTION DRY CHEST DRAIN OASIS 3600-100

## (undated) DEVICE — SUCTION MANIFOLD NEPTUNE 2 SYS 4 PORT 0702-020-000

## (undated) DEVICE — DRAPE SHEET REV FOLD 3/4 9349

## (undated) DEVICE — LINEN TOWEL PACK X5 5464

## (undated) DEVICE — ESU LIGASURE MARYLAND LAPAROSCOPIC SLR/DVDR 5MMX37CM LF1937

## (undated) DEVICE — SU SILK 2-0 SH 30" K833H

## (undated) DEVICE — SOL WATER IRRIG 500ML BOTTLE 2F7113

## (undated) DEVICE — ENDO BITE BLOCK ADULT OMNI-BLOC

## (undated) DEVICE — GLOVE BIOGEL PI MICRO INDICATOR UNDERGLOVE SZ 8.0 48980

## (undated) DEVICE — ESU GROUND PAD ADULT W/CORD E7507

## (undated) DEVICE — ENDO SCOPE WARMER SEAL  C3101

## (undated) DEVICE — SU PLEDGET SOFT TFE 13MMX7MMX1.5MM D7044

## (undated) DEVICE — SU VICRYL 3-0 SH 27" UND J416H

## (undated) DEVICE — LINEN TOWEL PACK X6 WHITE 5487

## (undated) DEVICE — DRSG PRIMAPORE 02X3" 7133

## (undated) DEVICE — LINEN GOWN XLG 5407

## (undated) DEVICE — SU TICRON 2 GS-21DA 36" 3146-81

## (undated) DEVICE — DRAPE IOBAN INCISE 23X17" 6650EZ

## (undated) DEVICE — DRAIN PENROSE 1/4"X18" LATEX  30416-025

## (undated) DEVICE — TUBING SUCTION 10'X3/16" N510

## (undated) DEVICE — SYR 30ML SLIP TIP W/O NDL 302833

## (undated) DEVICE — KIT ENDO TURNOVER/PROCEDURE CARRY-ON 101822

## (undated) DEVICE — CONNECTOR BLAKE DRAIN SGL BCC1

## (undated) DEVICE — SU VICRYL 0 UR-6 27" J603H

## (undated) DEVICE — SUCTION MANIFOLD NEPTUNE 2 SYS 1 PORT 702-025-000

## (undated) DEVICE — Device

## (undated) RX ORDER — CLINDAMYCIN PHOSPHATE 900 MG/50ML
INJECTION, SOLUTION INTRAVENOUS
Status: DISPENSED
Start: 2023-12-29

## (undated) RX ORDER — HYDROMORPHONE HCL IN WATER/PF 6 MG/30 ML
PATIENT CONTROLLED ANALGESIA SYRINGE INTRAVENOUS
Status: DISPENSED
Start: 2023-12-29

## (undated) RX ORDER — FENTANYL CITRATE 50 UG/ML
INJECTION, SOLUTION INTRAMUSCULAR; INTRAVENOUS
Status: DISPENSED
Start: 2023-12-29

## (undated) RX ORDER — LIDOCAINE HYDROCHLORIDE 20 MG/ML
SOLUTION OROPHARYNGEAL
Status: DISPENSED
Start: 2023-10-25

## (undated) RX ORDER — LIDOCAINE HYDROCHLORIDE AND EPINEPHRINE 10; 10 MG/ML; UG/ML
INJECTION, SOLUTION INFILTRATION; PERINEURAL
Status: DISPENSED
Start: 2023-12-29

## (undated) RX ORDER — ACETAMINOPHEN 325 MG/1
TABLET ORAL
Status: DISPENSED
Start: 2023-12-29

## (undated) RX ORDER — EPHEDRINE SULFATE 50 MG/ML
INJECTION, SOLUTION INTRAMUSCULAR; INTRAVENOUS; SUBCUTANEOUS
Status: DISPENSED
Start: 2023-12-29

## (undated) RX ORDER — HYDROMORPHONE HYDROCHLORIDE 1 MG/ML
INJECTION, SOLUTION INTRAMUSCULAR; INTRAVENOUS; SUBCUTANEOUS
Status: DISPENSED
Start: 2023-12-29

## (undated) RX ORDER — LIDOCAINE HYDROCHLORIDE 10 MG/ML
INJECTION, SOLUTION EPIDURAL; INFILTRATION; INTRACAUDAL; PERINEURAL
Status: DISPENSED
Start: 2023-08-23

## (undated) RX ORDER — BUPIVACAINE HYDROCHLORIDE 2.5 MG/ML
INJECTION, SOLUTION EPIDURAL; INFILTRATION; INTRACAUDAL
Status: DISPENSED
Start: 2023-12-29